# Patient Record
Sex: FEMALE | Race: WHITE | NOT HISPANIC OR LATINO | Employment: OTHER | URBAN - METROPOLITAN AREA
[De-identification: names, ages, dates, MRNs, and addresses within clinical notes are randomized per-mention and may not be internally consistent; named-entity substitution may affect disease eponyms.]

---

## 2021-04-14 ENCOUNTER — TRANSFERRED RECORDS (OUTPATIENT)
Dept: HEALTH INFORMATION MANAGEMENT | Facility: CLINIC | Age: 36
End: 2021-04-14

## 2021-05-15 ENCOUNTER — TRANSFERRED RECORDS (OUTPATIENT)
Dept: HEALTH INFORMATION MANAGEMENT | Facility: CLINIC | Age: 36
End: 2021-05-15

## 2021-05-18 ENCOUNTER — TRANSFERRED RECORDS (OUTPATIENT)
Dept: HEALTH INFORMATION MANAGEMENT | Facility: CLINIC | Age: 36
End: 2021-05-18

## 2021-06-02 ENCOUNTER — OFFICE VISIT (OUTPATIENT)
Dept: FAMILY MEDICINE | Facility: CLINIC | Age: 36
End: 2021-06-02
Payer: COMMERCIAL

## 2021-06-02 VITALS
DIASTOLIC BLOOD PRESSURE: 80 MMHG | HEIGHT: 65 IN | OXYGEN SATURATION: 98 % | SYSTOLIC BLOOD PRESSURE: 117 MMHG | TEMPERATURE: 98.8 F | HEART RATE: 85 BPM | WEIGHT: 200.8 LBS | BODY MASS INDEX: 33.45 KG/M2

## 2021-06-02 DIAGNOSIS — D23.9 DERMATOFIBROMA: ICD-10-CM

## 2021-06-02 DIAGNOSIS — Z76.89 ENCOUNTER TO ESTABLISH CARE WITH NEW DOCTOR: ICD-10-CM

## 2021-06-02 DIAGNOSIS — Z82.0 FAMILY HISTORY OF MS (MULTIPLE SCLEROSIS): Primary | ICD-10-CM

## 2021-06-02 PROBLEM — Z86.59 HISTORY OF DEPRESSION: Status: ACTIVE | Noted: 2021-06-02

## 2021-06-02 PROBLEM — J45.909 ASTHMA: Status: ACTIVE | Noted: 2021-06-02

## 2021-06-02 PROBLEM — F41.9 ANXIETY: Status: ACTIVE | Noted: 2021-06-02

## 2021-06-02 PROCEDURE — 86255 FLUORESCENT ANTIBODY SCREEN: CPT | Mod: 90 | Performed by: STUDENT IN AN ORGANIZED HEALTH CARE EDUCATION/TRAINING PROGRAM

## 2021-06-02 PROCEDURE — 36415 COLL VENOUS BLD VENIPUNCTURE: CPT | Performed by: STUDENT IN AN ORGANIZED HEALTH CARE EDUCATION/TRAINING PROGRAM

## 2021-06-02 PROCEDURE — 99203 OFFICE O/P NEW LOW 30 MIN: CPT | Mod: GC | Performed by: STUDENT IN AN ORGANIZED HEALTH CARE EDUCATION/TRAINING PROGRAM

## 2021-06-02 PROCEDURE — 99000 SPECIMEN HANDLING OFFICE-LAB: CPT | Performed by: STUDENT IN AN ORGANIZED HEALTH CARE EDUCATION/TRAINING PROGRAM

## 2021-06-02 RX ORDER — PNV NO.95/FERROUS FUM/FOLIC AC 28MG-0.8MG
TABLET ORAL
COMMUNITY
End: 2022-03-28

## 2021-06-02 RX ORDER — LORATADINE 10 MG/1
10 TABLET ORAL DAILY PRN
COMMUNITY

## 2021-06-02 ASSESSMENT — MIFFLIN-ST. JEOR: SCORE: 1616.7

## 2021-06-02 NOTE — PROGRESS NOTES
"    Assessment & Plan     Family history of MS (multiple sclerosis)  Cursory neuro exam within normal limits today. Following with Neurology who has recommended full workup that so far has been normal. Needs one last test, NMO antibodies, which we will draw today. Send out lab to [place]. Will fax results to Neuro clinic at [phone].   - Neuromyelitis Optica AQP4 IgG w Reflex Blood    Dermatofibroma  Skin lesion consistent with dermatofibroma. Reassurance provided, especially as it is not changing or growing. Advised her to follow-up if changing or bothersome.    Encounter to establish care with new doctor  Up to date on her HM. No other acute issues.       FUTURE APPOINTMENTS:       - Follow-up visit in 1 year for CPE, sooner prn    No follow-ups on file.    John Finnegan MD  [facility] RENEE Valdes is a 35 year old who presents for the following health issues     HPI     New patient to me. Has been working up neuro symptoms of numbness and tingling with Neurology who has done an MRI which was normal, and have been doing labwork, but weren't able to get one of her labs done, the NMO antibodies. She needs this lab drawn today. No other concerns. Last pap not in our system, but reportedly was done with midwives during last pregnancy a year and a half ago. Works at a [employer]. Also has a lesion she wants looked at on her posterior calf. Has had it for at least a couple years and it is not changing. No spontaneous bleeding.        Objective    /80   Pulse 85   Temp 98.8  F (37.1  C) (Oral)   Ht 1.651 m (5' 5\")   Wt 91.1 kg (200 lb 12.8 oz)   SpO2 98%   BMI 33.41 kg/m    Body mass index is 33.41 kg/m .  Physical Exam  Constitutional:       General: She is not in acute distress.     Appearance: She is well-developed.   HENT:      Head: Normocephalic and atraumatic.      Mouth/Throat:      Mouth: Mucous membranes are moist.   Eyes:      General: No scleral icterus.   "   Conjunctiva/sclera: Conjunctivae normal.   Cardiovascular:      Rate and Rhythm: Normal rate and regular rhythm.      Heart sounds: Normal heart sounds. No murmur. No friction rub.   Pulmonary:      Effort: Pulmonary effort is normal. No respiratory distress.      Breath sounds: Normal breath sounds.   Skin:     General: Skin is warm and dry.      Findings: Lesion (3 mm pink, pearly papule noted on posterior left calf, firm to palpation) present. No erythema.   Neurological:      General: No focal deficit present.      Mental Status: She is alert and oriented to person, place, and time.      Cranial Nerves: No cranial nerve deficit.      Sensory: No sensory deficit.      Motor: No weakness.   Psychiatric:         Mood and Affect: Mood normal.         Thought Content: Thought content normal.

## 2021-06-02 NOTE — PATIENT INSTRUCTIONS
Patient Education   Here is the plan from today's visit    1. Family history of MS (multiple sclerosis)  - Neuromyelitis Optica AQP4 IgG w Reflex Blood    2. Dermatofibroma  Keep an eye on this, and let us know if anything changes with it.      Please call or return to clinic if your symptoms don't go away.    Follow up plan  No follow-ups on file.    Thank you for coming to \[facility]\"" Clinic today.  COVID-19 Vaccine:  If you are eligible for the COVID-19 vaccine, you can schedule via [facility] or call [facility] at [phone]. If you need assistance with scheduling, please speak to a Care Coordinator or your provider.   Lab Testing:  **If you had lab testing today and your results are reassuring or normal they will be mailed to you or sent through [facility] within 7 days.   **If the lab tests need quick action we will call you with the results.  **If you are having labs done on a different day, please call [phone] to schedule at [facility] or [phone] for other [facility] locations.   The phone number we will call with results is # [phone] (home) . If this is not the best number please call our clinic and change the number.  Medication Refills:  If you need any refills please call your pharmacy and they will contact us.   If you need to  your refill at a new pharmacy, please contact the new pharmacy directly. The new pharmacy will help you get your medications transferred faster.   Scheduling:  If you have any concerns about today's visit or wish to schedule another appointment please call our office during normal business hours [phone] (8-5:00 M-F)  If a referral was made to a [facility] and you don't get a call from central scheduling please call [phone].  If a Mammogram was ordered for you at [facility] call [phone] to schedule or change your appointment.  If you had an EKG/XRay/CT/Ultrasound/MRI ordered the number is  [phone] to schedule or change your radiology appointment.   Medical Concerns:  If you have urgent medical concerns please call [phone] at any time of the day.    John Finnegan MD

## 2021-06-02 NOTE — PROGRESS NOTES
Preceptor Attestation:  Patient seen and evaluated in person. I discussed the patient with the resident. I have verified the content of the note, which accurately reflects my assessment of the patient and the plan of care.   Supervising Physician:  Yoel Chen DO.

## 2021-06-04 LAB — AQP4 H2O CHANNEL IGG TITR SERPL IF: NORMAL {TITER}

## 2021-06-08 ENCOUNTER — TRANSFERRED RECORDS (OUTPATIENT)
Dept: HEALTH INFORMATION MANAGEMENT | Facility: CLINIC | Age: 36
End: 2021-06-08

## 2021-11-11 ENCOUNTER — TRANSFERRED RECORDS (OUTPATIENT)
Dept: HEALTH INFORMATION MANAGEMENT | Facility: CLINIC | Age: 36
End: 2021-11-11
Payer: COMMERCIAL

## 2021-11-23 ENCOUNTER — TRANSFERRED RECORDS (OUTPATIENT)
Dept: HEALTH INFORMATION MANAGEMENT | Facility: CLINIC | Age: 36
End: 2021-11-23
Payer: COMMERCIAL

## 2022-01-25 ENCOUNTER — TRANSFERRED RECORDS (OUTPATIENT)
Dept: HEALTH INFORMATION MANAGEMENT | Facility: CLINIC | Age: 37
End: 2022-01-25
Payer: COMMERCIAL

## 2022-03-22 ENCOUNTER — OFFICE VISIT (OUTPATIENT)
Dept: URGENT CARE | Facility: URGENT CARE | Age: 37
End: 2022-03-22
Payer: COMMERCIAL

## 2022-03-22 ENCOUNTER — ANCILLARY PROCEDURE (OUTPATIENT)
Dept: GENERAL RADIOLOGY | Facility: CLINIC | Age: 37
End: 2022-03-22
Attending: NURSE PRACTITIONER
Payer: COMMERCIAL

## 2022-03-22 VITALS
TEMPERATURE: 97.2 F | RESPIRATION RATE: 14 BRPM | OXYGEN SATURATION: 97 % | SYSTOLIC BLOOD PRESSURE: 114 MMHG | DIASTOLIC BLOOD PRESSURE: 66 MMHG | HEART RATE: 82 BPM

## 2022-03-22 DIAGNOSIS — S92.352A CLOSED DISPLACED FRACTURE OF FIFTH METATARSAL BONE OF LEFT FOOT, INITIAL ENCOUNTER: Primary | ICD-10-CM

## 2022-03-22 DIAGNOSIS — S99.921A FOOT INJURY, RIGHT, INITIAL ENCOUNTER: ICD-10-CM

## 2022-03-22 PROCEDURE — 73630 X-RAY EXAM OF FOOT: CPT | Mod: RT | Performed by: RADIOLOGY

## 2022-03-22 PROCEDURE — 99214 OFFICE O/P EST MOD 30 MIN: CPT | Performed by: NURSE PRACTITIONER

## 2022-03-22 RX ORDER — HYDROCODONE BITARTRATE AND ACETAMINOPHEN 5; 325 MG/1; MG/1
1 TABLET ORAL EVERY 6 HOURS PRN
Qty: 10 TABLET | Refills: 0 | Status: SHIPPED | OUTPATIENT
Start: 2022-03-22 | End: 2022-04-01

## 2022-03-22 NOTE — PROGRESS NOTES
Chief Complaint   Patient presents with     Foot Injury     right foot     SUBJECTIVE:  Keisha Carroll is a 36 year old female who presents to the clinic today with a right foot injury. She tripped down concrete step, ankle gave out, severe 5th metatarsal pain, swelling, lost ROM. She has had stress fractures in the past. Wheelchair bound due to inability to bear weight. [place] PDMP checked and no controlled substance within the year.    No past medical history on file.  cholecalciferol 25 MCG (1000 UT) TABS, Take 1,000 Units by mouth  loratadine (CLARITIN) 10 MG tablet, Take 10 mg by mouth  Prenatal Vit-Fe Fumarate-FA (PRENATAL VITAMIN) 27-0.8 MG TABS, Prenatal Vitamin  sertraline (ZOLOFT) 50 MG tablet, Take 1 tablet (50 mg) by mouth daily    No current facility-administered medications on file prior to visit.    Social History     Tobacco Use     Smoking status: Never Smoker     Smokeless tobacco: Never Used   Substance Use Topics     Alcohol use: Yes     Comment: once or twice a week      Allergies   Allergen Reactions     Amoxicillin Hives     Review of Systems   All systems negative except for those listed above in HPI.    EXAM:   /66   Pulse 82   Temp 97.2  F (36.2  C)   Resp 14   SpO2 97%     Physical Exam  Vitals reviewed.   Constitutional:       Appearance: Normal appearance.   HENT:      Head: Normocephalic and atraumatic.   Cardiovascular:      Rate and Rhythm: Normal rate.   Pulmonary:      Effort: Pulmonary effort is normal.   Musculoskeletal:         General: Swelling, tenderness, deformity and signs of injury present.      Comments: Right 5th metatarsal edema, bump, tenderness, limited ROM. Good color, warmth, pulses.   Skin:     General: Skin is warm and dry.      Findings: No bruising, erythema or rash.   Neurological:      General: No focal deficit present.      Mental Status: She is alert and oriented to person, place, and time.   Psychiatric:         Mood and Affect: Mood normal.          Behavior: Behavior normal.       Xray done in clinic read by me as positive for 5th metatarsal fracture.  Results for orders placed or performed in visit on 03/22/22   XR Foot Right G/E 3 Views     Status: None    Narrative    EXAM: XR FOOT RIGHT G/E 3 VIEWS  LOCATION: [facility]  DATE/TIME: 3/22/2022 5:06 PM    INDICATION: Foot pain after a fall on stairs.  COMPARISON: None.      Impression    IMPRESSION: Acute obliquely oriented midshaft fracture of the right fifth metatarsal with 2 mm displacement and mild angulation (apex dorsolateral). No other fracture. Normal joint alignment and spacing. Mild soft tissue swelling in the lateral forefoot.     ASSESSMENT:    ICD-10-CM    1. Closed displaced fracture of fifth metatarsal bone of left foot, initial encounter  S92.352A HYDROcodone-acetaminophen (NORCO) 5-325 MG tablet     Orthopedic  Referral     Ankle/Foot Bracing Supplies Order for DME - ONLY FOR DME   2. Foot injury, right, initial encounter  S99.921A XR Foot Right G/E 3 Views     PLAN:    Foot fracture  Short boot in clinic  Rest, ice, comrpession, elevate  Rotate tylenol and ibuprofen, Norco as needed for severe pain  Ortho tomorrow for follow-up, can call insurance to discuss in network ortho options  ER if numb, pale, cold, pulseless, cannot wiggle toes, severe pain    Patient Instructions     Patient Education     Broken Foot    You have a broken bone (fracture) in your foot. This will cause pain, swelling, and often bruising. It will usually take about 4 to 8 weeks to heal. A foot fracture may be treated with a special shoe, splint, cast, or boot.   Home care  Follow these guidelines when caring for yourself at home:    You may be given a splint, cast, shoe, or boot to keep the injured area from moving. Unless you were told otherwise, use crutches or a walker. Don t put weight on the injured foot until your healthcare provider says you can do so. (You can rent crutches  and a walker at many drugstores and surgical or orthopedic supply stores.) Don t put weight on a splint, or it may break.    Keep your leg elevated to reduce pain and swelling. When sleeping, put a pillow under the injured leg. When sitting, support the injured leg so it's above your heart. This is very important during the first 2 days (48 hours).    Put an ice pack on the injured area. Do this for 20 minutes every 1 to 2 hours the first day for pain relief. You can make an ice pack by wrapping a plastic bag of ice cubes in a thin towel. As the ice melts, be careful that the splint, cast, boot, or shoe doesn t get wet. You can place the ice pack directly over the splint or cast. Unless told otherwise, you can open the boot or shoe to apply the ice pack. Continue using the ice pack 3 to 4 times a day for the next 2 days. Then use the ice pack as needed to ease pain and swelling.    Keep the splint, cast, boot, or shoe dry. When bathing, protect it with a large plastic bag, rubber-banded at the top end. If a fiberglass splint or cast or boot gets wet, you can dry it with a hair dryer. Unless told otherwise, you can take off the boot or shoe to bathe.    You may use acetaminophen or ibuprofen to control pain, unless another pain medicine was prescribed. If you have chronic liver or kidney disease, talk with your healthcare provider before using these medicines. Also talk with your provider if you ve had a stomach ulcer or gastrointestinal bleeding.    Don t put creams or objects under the cast if you have itching.  Follow-up care  Follow up with your healthcare provider, or as advised. This is to make sure the bone is healing the way it should. If you were given a splint, it may be changed to a cast or boot at your follow-up visit.   X-rays may be taken. You will be told of any new findings that may affect your care.  When to seek medical advice  Call your healthcare provider right away if any of these occur:    The  cast or splint cracks    The plaster cast or splint becomes wet or soft    The fiberglass cast or splint stays wet for more than 24 hours    Bad odor from the cast or wound fluid stains the cast    Tightness or pain under the cast or splint gets worse    Toes become swollen, cold, blue, numb, or tingly    You can t move your toes    Skin around cast or splint becomes red or swollen    Fever of 100.4 F (38 C) or higher, or as directed by your healthcare provider    Tanika Robison last reviewed this educational content on 9/1/2019 2000-2021 [employer]. All rights reserved. This information is not intended as a substitute for professional medical care. Always follow your healthcare professional's instructions.             Follow up with primary care provider with any problems, questions or concerns or if symptoms worsen or fail to improve. Patient agreed to plan and verbalized understanding.    Tanja Charles, JIN-BC  [facility]

## 2022-03-22 NOTE — PATIENT INSTRUCTIONS
Patient Education     Broken Foot    You have a broken bone (fracture) in your foot. This will cause pain, swelling, and often bruising. It will usually take about 4 to 8 weeks to heal. A foot fracture may be treated with a special shoe, splint, cast, or boot.   Home care  Follow these guidelines when caring for yourself at home:    You may be given a splint, cast, shoe, or boot to keep the injured area from moving. Unless you were told otherwise, use crutches or a walker. Don t put weight on the injured foot until your healthcare provider says you can do so. (You can rent crutches and a walker at many drugstores and surgical or orthopedic supply stores.) Don t put weight on a splint, or it may break.    Keep your leg elevated to reduce pain and swelling. When sleeping, put a pillow under the injured leg. When sitting, support the injured leg so it's above your heart. This is very important during the first 2 days (48 hours).    Put an ice pack on the injured area. Do this for 20 minutes every 1 to 2 hours the first day for pain relief. You can make an ice pack by wrapping a plastic bag of ice cubes in a thin towel. As the ice melts, be careful that the splint, cast, boot, or shoe doesn t get wet. You can place the ice pack directly over the splint or cast. Unless told otherwise, you can open the boot or shoe to apply the ice pack. Continue using the ice pack 3 to 4 times a day for the next 2 days. Then use the ice pack as needed to ease pain and swelling.    Keep the splint, cast, boot, or shoe dry. When bathing, protect it with a large plastic bag, rubber-banded at the top end. If a fiberglass splint or cast or boot gets wet, you can dry it with a hair dryer. Unless told otherwise, you can take off the boot or shoe to bathe.    You may use acetaminophen or ibuprofen to control pain, unless another pain medicine was prescribed. If you have chronic liver or kidney disease, talk with your healthcare provider before  using these medicines. Also talk with your provider if you ve had a stomach ulcer or gastrointestinal bleeding.    Don t put creams or objects under the cast if you have itching.  Follow-up care  Follow up with your healthcare provider, or as advised. This is to make sure the bone is healing the way it should. If you were given a splint, it may be changed to a cast or boot at your follow-up visit.   X-rays may be taken. You will be told of any new findings that may affect your care.  When to seek medical advice  Call your healthcare provider right away if any of these occur:    The cast or splint cracks    The plaster cast or splint becomes wet or soft    The fiberglass cast or splint stays wet for more than 24 hours    Bad odor from the cast or wound fluid stains the cast    Tightness or pain under the cast or splint gets worse    Toes become swollen, cold, blue, numb, or tingly    You can t move your toes    Skin around cast or splint becomes red or swollen    Fever of 100.4 F (38 C) or higher, or as directed by your healthcare provider    Tanika Robison last reviewed this educational content on 9/1/2019 2000-2021 [employer]. All rights reserved. This information is not intended as a substitute for professional medical care. Always follow your healthcare professional's instructions.

## 2022-03-23 ENCOUNTER — TELEPHONE (OUTPATIENT)
Dept: ORTHOPEDICS | Facility: CLINIC | Age: 37
End: 2022-03-23
Payer: COMMERCIAL

## 2022-03-23 ENCOUNTER — PRE VISIT (OUTPATIENT)
Dept: ORTHOPEDICS | Facility: CLINIC | Age: 37
End: 2022-03-23
Payer: COMMERCIAL

## 2022-03-23 ENCOUNTER — DOCUMENTATION ONLY (OUTPATIENT)
Dept: ORTHOPEDICS | Facility: CLINIC | Age: 37
End: 2022-03-23
Payer: COMMERCIAL

## 2022-03-23 ENCOUNTER — OFFICE VISIT (OUTPATIENT)
Dept: PODIATRY | Facility: CLINIC | Age: 37
End: 2022-03-23
Payer: COMMERCIAL

## 2022-03-23 VITALS
SYSTOLIC BLOOD PRESSURE: 114 MMHG | HEIGHT: 65 IN | BODY MASS INDEX: 31.65 KG/M2 | DIASTOLIC BLOOD PRESSURE: 66 MMHG | WEIGHT: 190 LBS

## 2022-03-23 DIAGNOSIS — S92.351A DISPLACED FRACTURE OF FIFTH METATARSAL BONE, RIGHT FOOT, INITIAL ENCOUNTER FOR CLOSED FRACTURE: ICD-10-CM

## 2022-03-23 DIAGNOSIS — M79.671 RIGHT FOOT PAIN: Primary | ICD-10-CM

## 2022-03-23 DIAGNOSIS — S92.352A CLOSED DISPLACED FRACTURE OF FIFTH METATARSAL BONE OF LEFT FOOT, INITIAL ENCOUNTER: ICD-10-CM

## 2022-03-23 PROCEDURE — 99204 OFFICE O/P NEW MOD 45 MIN: CPT | Performed by: PODIATRIST

## 2022-03-23 NOTE — TELEPHONE ENCOUNTER
Patient has a referral regarding Closed displaced fracture of fifth metatarsal bone of left foot, to be seen in 1-2 days.  Please assist patient with scheduling appt with appropriate provider within appropriate time frame at [phone].  Thank you for you help!

## 2022-03-23 NOTE — PATIENT INSTRUCTIONS
Thank you for choosing [facility] Podiatry / Foot & Ankle Surgery!    DR MANCILLA'S CLINIC:  [facility]   [address]   [place] [address]      TRIAGE LINE: [phone] - [address]  APPOINTMENTS: [phone]  RADIOLOGY: [phone]  SET UP SURGERY: [phone]  FAX NUMBER: [phone]  BILLING QUESTIONS: [phone]       Follow up: We will call you to schedule surgery.  If you do not hear from us in 2 to 3 days please call the number above    TOE & METATARSAL FRACTURES  The structure of the foot is complex, consisting of bones, muscles, tendons, and other soft tissues. Of the 26 bones in the foot, 19 are toe bones (phalanges) and metatarsal bones (the long bones in the midfoot). Fractures of the toe and metatarsal bones are common and require evaluation by a specialist. A foot and ankle surgeon should be seen for proper diagnosis and treatment, even if initial treatment has been received in an emergency room.  A fracture is a break in the bone. Fractures can be divided into two categories: traumatic fractures and stress fractures.  TRAUMATIC FRACTURES (also called acute fractures) are caused by a direct blow or impact, such as seriously stubbing your toe. Traumatic fractures can be displaced or non-displaced. If the fracture is displaced, the bone is broken in such a way that it has changed in position (dislocated).  Signs and symptoms of a traumatic fracture include:  You may hear a sound at the time of the break.    Pinpoint pain  (pain at the place of impact) at the time the fracture occurs and perhaps for a few hours later, but often the pain goes away after several hours.   Crooked or abnormal appearance of the toe.   Bruising and swelling the next day.   It is not true that  if you can walk on it, it s not broken.  Evaluation by a foot and ankle surgeon is always recommended.   STRESS FRACTURES are tiny, hairline breaks that are usually caused by repetitive stress. Stress  fractures often afflict athletes who, for example, too rapidly increase their running mileage. They can also be caused by an abnormal foot structure, deformities, or osteoporosis. Improper footwear may also lead to stress fractures. Stress fractures should not be ignored. They require proper medical attention to heal correctly.  Symptoms of stress fractures include:  Pain with or after normal activity   Pain that goes away when resting and then returns when standing or during activity    Pinpoint pain  (pain at the site of the fracture) when touched   Swelling, but no bruising   IMPROPER TREATMENT  Some people say that  the doctor can t do anything for a broken bone in the foot.  This is usually not true. In fact, if a fractured toe or metatarsal bone is not treated correctly, serious complications may develop. For example:  A deformity in the bony architecture which may limit the ability to move the foot or cause difficulty in fitting shoes   Arthritis, which may be caused by a fracture in a joint (the juncture where two bones meet), or may be a result of angular deformities that develop when a displaced fracture is severe or hasn t been properly corrected   Chronic pain and deformity   Non-union, or failure to heal, can lead to subsequent surgery or chronic pain.   PROPER TREATMENT FOR TOES  Fractures of the toe bones are almost always traumatic fractures. Treatment for traumatic fractures depends on the break itself and may include these options:  Rest. Sometimes rest is all that is needed to treat a traumatic fracture of the toe.   Splinting. The toe may be fitted with a splint to keep it in a fixed position.   Rigid or stiff-soled shoe. Wearing a stiff-soled shoe protects the toe and helps keep it properly positioned.    Jp taping  the fractured toe to another toe is sometimes appropriate, but in other cases it may be harmful.   Surgery. If the break is badly displaced or if the joint is affected, surgery  may be necessary. Surgery often involves the use of fixation devices, such as pins.   PROPER TREATMENT OF METATARSALS  Breaks in the metatarsal bones may be either stress or traumatic fractures. Certain kinds of fractures of the metatarsal bones present unique challenges.  For example, sometimes a fracture of the first metatarsal bone (behind the big toe) can lead to arthritis. Since the big toe is used so frequently and bears more weight than other toes, arthritis in that area can make it painful to walk, bend, or even stand.  Another type of break, called a Tesfaye fracture, occurs at the base of the fifth metatarsal bone (behind the little toe). It is often misdiagnosed as an ankle sprain, and misdiagnosis can have serious consequences since sprains and fractures require different treatments. Your foot and ankle surgeon is an expert in correctly identifying these conditions as well as other problems of the foot.  Treatment of metatarsal fractures depends on the type and extent of the fracture, and may include:  Rest. Sometimes rest is the only treatment needed to promote healing of a stress or traumatic fracture of a metatarsal bone.   Avoid the offending activity. Because stress fractures result from repetitive stress, it is important to avoid the activity that led to the fracture. Crutches or a wheelchair are sometimes required to offload weight from the foot to give it time to heal.   Immobilization, casting, or rigid shoe. A stiff-soled shoe or other form of immobilization may be used to protect the fractured bone while it is healing.   Surgery. Some traumatic fractures of the metatarsal bones require surgery, especially if the break is badly displaced.   Follow-up care. Your foot and ankle surgeon will provide instructions for care following surgical or non-surgical treatment. Physical therapy, exercises and rehabilitation may be included in a schedule for return to normal activities.     GETTING READY FOR  YOUR SURGERY  ONE-THREE WEEKS BEFORE  1. See your Family Doctor or Primary Care Doctor for a History and Physical. If you do not, we may need to change the date of your surgery.  2. Please see pre-surgical medications below to which medications need to be stopped before surgery and when.    TEN OR MORE DAYS BEFORE    1. [place] with the hospital. (For [facility])      By Phone: [phone].      By Internet: [email or web address] Choose [facility].      If you do not register by phone or online, we will call to help you register.    SAME DAY SURGERY PATIENTS  1. You will need a family member of friend to drive you home. If you do not have one the surgery will be cancelled or rescheduled.  2. You will need a responsible adult to stay with you that night after the surgery.       We will ask this person to listen to some instructions before you leave the hospital.  * If your child is having surgery, and you would like a tour of the hospital, please call: [phone].      DAY BEFORE SURGERY  1. DO NOT EAT OR DRINK ANYTHING AFTER MIDNIGHT THE NIGHT BEFORE YOUR SURGERY!   2. DO NOT DRINK ALCOHOL.  3. Do not take over the counter drugs.  4. Some people need to have blood tests at the hospital. If you need blood tests, we will tell you in advance.  5. Take medications as directed by your doctor. You may take these with a small amount of water.  6. Do not chew gum, chew tobacco, or suck on hard candy the day of surgery.  7. Bring your insurance cards, a list of your medicines and co-pays you might need. Leave jewelry and other valuables at home.  8. If you received papers at your doctor's office, bring these with you to the surgery.    If you have questions about these instructions, please call: [phone]  Ask to speak with a pre-admitting nurse.    PRESURGICAL MEDICATIONS:  Certain prescription, over-the-counter, and herbal medications interfere with healing after an operation. The main concern  relates to medications that increase bleeding at the surgical site. Excess blood under the incision results in poor wound healing, excess pain, increased scarring, and a higher risk of infection.    Some medications slow the healing process of bone. Medications can also interfere with the anesthesia drugs that keep you asleep during the operation. It is important to ensure that these medications are out of your system prior to the operation. The list below details a number of medications that are of concern. Pay special attention to how long you should avoid these medications before your operation. Please note that this list is not complete. You should ask your surgeon or pharmacist if you are uncertain about other medications. Any herbal supplement not listed should be discontinued at least one week prior to surgery.    Aspirin: Hold for one week prior to surgery and restart the day after surgery. This over the counter medication promotes bleeding.    Motrin / Ibuprofen / Aleve / Advil / NSAIDS:  Stop one week prior to surgery. These medications affect bleeding and may cause delay in bone healing. Avoid taking these medications for six weeks after bone surgeries. Other procedures may allow you to restart sooner than 6 weeks after surgery.    Coumadin / Plavix: Your primary care provider will manage Coumadin in relation to surgery. Coumadin may result in excessive bleeding and may be adjusted before and after surgery.    Enbriel: Stop two weeks prior to surgery and restart two weeks after surgery. This medication can effect soft tissue healing and increases the risk of infection.    Remicade: Stop 8-12 weeks before surgery and restart two weeks after surgery. This medication can affect soft tissue healing and increases the risk of infection.    Humira: Stop 4 weeks before surgery and restart two weeks after surgery. This medication can affect soft tissue healing and increases the risk of infection.    Methotrexate:  Stop one dose prior to surgery. This medication will be restarted when the wound appears to be healing well. Please ask your surgeon about restarting this medication when you are being seen in the office for wound checks.    Kava: Stop at least one day prior to surgery and may restart one day after surgery. Kava may increase the sedative effect of anesthetics that are given during the operation. Kava can also increase bleeding at the surgical site.    Ephedra (ma torres): Stop at least one day prior to surgery and may restart one day after surgery.  Ephedra may increase the risk of heart attack and stroke. This medication can also increase bleeding at the surgical site.    Praful's Wort: Stop at least five days before surgery and may restart one day after surgery. [place]'s wort may diminish the effects of several drugs that are given during surgery.    Ginseng: Stop at least one week prior to surgery and may restart one day after surgery.  Ginseng lowers blood sugar and may increase bleeding at the surgical site.    Ginkgo: Stop 36 hours before surgery and may restart one day after surgery. Ginkgo may increase bleeding at the surgical site.    Garlic: Stop at least one week prior to surgery and may restart one day after. Garlic may increase bleeding at the surgery site.    Valerian: Do a slow steady decrease in your daily dose over a period of 2-3 weeks before surgery to decrease the chance of withdrawal symptoms. Valerian may increase the sedative effect of anesthetics given during the operation.    Echinacea: There is no data on stopping echinacea prior to surgery. This medication though can be associated with allergic reactions and suppression of your immune system.    Vitamin E, Omega-3, Flax, Fish Oil, Glucosamine and Chondroitin: Stop 2 weeks prior to surgery and may restart one day after. These herbal medications can increase risk of bleeding at surgical site.     POTENTIAL COMPLICATIONS OF FOOT & ANKLE  SURGERY  Undergoing a surgical procedure involves a certain amount of risk. Risks of complications vary depending on the complexity of the surgery and how you take care of the surgical area during the healing process. Complications can range from minor infection to death. Some complications are temporary while others will be permanent.  Your surgeon weighs the risk of complications vs the potential benefit of undergoing surgery. You need to consider your tolerance for unexpected problems as you decide whether to undergo surgery.    Foot and Ankle surgery involves cutting skin, bone, ligaments, blood vessels and joints.  These structures heal well but not without consequence. Any break to the skin can lead to infection. A deep infection involves bones or joints which can be devastating. Deep infection can lead to amputation or could spread to other parts of your body. Most infections are minor and easily treated with oral antibiotics. Infections are often times from bacteria already present on your skin. Proper care of the surgical site is an essential component of avoiding infection. Do not get the bandage wet and take proper care of external pins to avoid these problems.     Joint stiffness is inherent to any foot or ankle surgery. Joint surgery is a major component of reconstructive foot and ankle procedures. The ligaments and tissues around the joint are cut, and later repaired. Scare tissue limits joint mobility. This can be permanent but generally improves over the course of one year.    Surgery involves dissection around nerves. Visible nerves are moved out of the way while very small nerves are cut. Scar tissue develops around nerves and can lead to nerve pain, numbness, or neuromas. Nerve symptoms can be permanent. This can lead to numbness or sometimes hypersensitivity to touch and problems wearing shoes.    Bones do not always heal after surgery. Poor healing after a bone cut or joint fusion can lead to an  extended period of casting or repeat surgery. Electronic bone stimulators are sometimes used to stimulate poor healing of bone. Nonunion is when joint fusion does not take.  This can occur as often as 10% of the time. Smoking doubles your risk of poor bone healing to 20%.    Bone grafting is sometimes necessary during the original or subsequent surgery. Bone is sometimes taken from other parts of your body or freeze dried bone from a bone bank from a bone bank or synthetic bone material might be used.    A scar is always present after foot and ankle surgery. The scar will be visible and could be sensitive. Some people develop excessive scarring, which cannot be controlled by the surgeon. Scars can be unsightly and can restrict joint mobility.    Blood clots can develop in the calf after surgery. Foot and ankle surgery is a predisposing factor for blood clots. The blood clot could break and travel to your lung.  This condition can lead to death. Early warning signs could include calf swelling and pain, chest pain or shortness of breath. This is an emergency that requires immediate attention by a medical doctor!    Surgery will not necessarily create a pain-free foot. Even normal feet hurt. Crooked toes, bunions, neuromas, flat feet and arthritis should all be considered permanent conditions.  Ankle pain commonly requires multiple surgeries over a lifetime. Do not assume that having surgery will permanently fix your condition. You may need permanent alteration in shoes and activities to accommodate your foot and ankle problem.    Careful attention to post-operative recommendations will dramatically reduce your risk of complications. Proper dressing, wound care, elevation and rest will be essential to get the wound healed and minimize scarring. Strict attention to activity restrictions, such as non-weight bearing, or partial weight bearing is essential. Internal fixation devices may not resist the stress of walking.  Some select surgeries allow the patient to walk, however this should be very minimal.    Despite these concerns, foot and ankle surgery leads to a high level of patient satisfaction. Your surgeon would not recommend surgery if he/she did not expect your foot to improve. Talk to your surgeon about any of the above issues.    POST OPERATIVE HOME CARE INSTRUCTIONS  Activities: You should rest today. Stay off your feet as much as possible and keep your foot elevated above the level of your heart (about two pillow height). Wear your surgical shoe at all times when up. Limit walking to 5 to 10 minutes per hour over the next few days if your doctor has previously told you that you can put some weight on the foot after surgery, although limit the weight to your heel. If you are supposed to be non-weight bearing, that means NO WEIGHT AT ALL ON THE FOOT. Use an ice pack on the ankle while awake 20 minutes per hour to help decrease pain and swelling.     Discomfort: If a prescription for pain was given, take as directed. If no pain medication was ordered, you may take a non-prescription, non-aspirin pain medication. If the pain is not relieved by pain medication, call the clinic.     Incision and Dressing: Your surgical dressing is a sterile dressing and should be left in place until removed by your physician. Keep the dressing dry by covering it with a plastic bag for showers, taking baths with the surgical foot out of the tub, or sponge bathing. Some bleeding on the dressing should be expected. If however, you notice active or excessive bleeding or a temperature over 100 degrees by mouth, call the clinic. Do not change dressing by yourself.  If the dressing becomes wet or dirty, please call the clinic as it may need a new sterile dressing applied. You may start getting the foot wet after the stitches are removed.     Do not wear regular shoes with a surgical bandage and/or external pins in your foot. Wear loose fitting  clothing that easily will slip over the bandage and/or pins. Do not cover your surgical foot with blankets as they may damage the dressing/pins. Also, remember that dogs are not aware of your surgery. Please keep them away from the bandage/pins.   If your surgeon places external pins in your foot, you must keep the foot dry until the pins are removed at 6-8 weeks after the surgery. Pins should be covered with a dressing for protection. You should examine the pins and your skin often. Check for any spreading redness or yellow drainage from the pin areas. Do not apply ointment around the pins. Do not push a loose pin back into your foot. Please call the clinic if the pin is spinning or moving in and out. If the pins are bumped or loosened they may need to be removed early. This may affect your surgical outcome.   Please call the clinic if you feel there is a problem with your pins and/or surgical bandage.    TIPS FOR SUCCESSFUL HEALING  How you care for the surgical site is critically important to achieve a successful result after surgery. Avoidance of injury, infection, excess swelling, scar tissue and stiffness are highly dependent on the care you provide over the next six weeks. Please do not hesitate to call if you have questions or concerns.   Your foot requires significant rest and elevation. Sitting for long hours with your foot elevated, however, will create its own problems. Expect muscle aches, back pain, cramps, etc. Optimal posture, lumbar support, back exercises, ice and heat may all help with your new aches and pains. Do not apply a heating pad to your foot or leg as this can cause increase swelling and pain. Rather use ice in those areas.   Pain medications cause drowsiness. You may frequently sleep during the day and then have trouble sleeping at night. Over the counter sleep aids might be more effective than narcotic pain medication to achieve a reasonable night's sleep.    Narcotic pain medications  and inactivity lead to constipation. Limiting use of narcotics will help minimize this problem. The pain medications will not completely alleviate your pain. The purpose of pain pills is to take the edge off and help you get through the first few days. You can substitute Extra Strength Tylenol if pain is mild. Please note that narcotic pain pills usually contain acetaminophen (the active ingredient in Tylenol) so be careful to avoid the maximum dose of acetaminophen. You should take measures to avoid constipation by drinking plenty of water, eating lots of fruit and vegetables and taking the recommended dose of Metamucil or a similar fiber supplement. These measures should be continued for as long as you require narcotic pain medications and are inactive.     Showering is a major challenge. Your incision requires about three days to become sealed from water. Your bandage should not get wet and should not be removed. Do not attempt showering for the first three days. A sponge bath is preferred. You may attempt to shower on the fourth day after the operation. Your foot should be covered with a bag, tape and rubber bands. Double bagging is preferred. Standing in the shower with a bag on your foot is quite hazardous. A portable shower stool would be ideal. The bandage will need to be changed in the office if it becomes moistened. A moist bandage will not dry on its own. A moist dressing may lead to infection.   Stiffness will develop after any operation due to scarring. The scar tissue begins to form immediately after the surgery. Inactivity can cause excess stiffness and may lead to blood clots in your legs. Frequent range of motion exercises will help decrease stiffness, blood clots, scar tissue and adhesions. Please call if you are unsure about these recommendations.   Good luck and best wishes on a prompt recovery. Healing is slow but an important step in your recovery. You are in control of the final result. Please  use this time wisely. Please do not hesitate to call if you have questions, concerns or comments.    * If you have any post-operative questions or concerns regarding your procedure, call our triage team at the [facility] at [phone] (option 3).

## 2022-03-23 NOTE — LETTER
3/23/2022         RE: Keisha Carroll  [address]  [facility] [address]        Dear Colleague,    Thank you for referring your patient, Keisha Carroll, to the [facility]. Please see a copy of my visit note below.    PATIENT HISTORY:  Tanja Charles NP requested I see this patient for their foot issue.  Keisha Carroll is a 36 year old female who presents to clinic for right foot fracture.  Patient notes that she fractured it yesterday.  She stepped off the side of a curb and heard the crack.  Had instant pain swelling and bruising and was not able to put weight on the right foot.  She states that she thinks she had a stress fracture for a few weeks before this as it was sore.  Pain at the time was 9 out of 10.  Currently is about 2 out of 10.  Here with her significant other.  They would like to discuss x-rays and the next steps.  Patient denies fever, nausea, vomiting.    Review of Systems:  Patient denies fever, chills, rash, wound, stiffness,  numbness, weakness, heart burn, blood in stool, chest pain with activity, calf pain when walking, shortness of breath with activity, chronic cough, easy bleeding/bruising, swelling of ankles, excessive thirst, fatigue, depression, anxiety.  Patient admits to limping.     PAST MEDICAL HISTORY: No past medical history on file.     PAST SURGICAL HISTORY: No past surgical history on file.     MEDICATIONS:   Current Outpatient Medications:      HYDROcodone-acetaminophen (NORCO) 5-325 MG tablet, Take 1 tablet by mouth every 6 hours as needed for severe pain, Disp: 10 tablet, Rfl: 0     loratadine (CLARITIN) 10 MG tablet, Take 10 mg by mouth, Disp: , Rfl:      cholecalciferol 25 MCG (1000 UT) TABS, Take 1,000 Units by mouth, Disp: , Rfl:      Prenatal Vit-Fe Fumarate-FA (PRENATAL VITAMIN) 27-0.8 MG TABS, Prenatal Vitamin (Patient not taking: Reported on 3/23/2022), Disp: , Rfl:      sertraline (ZOLOFT) 50 MG tablet, Take 1 tablet  "(50 mg) by mouth daily, Disp: 30 tablet, Rfl: 3     ALLERGIES:    Allergies   Allergen Reactions     Amoxicillin Hives        SOCIAL HISTORY:   Social History     Socioeconomic History     Marital status:      Spouse name: Not on file     Number of children: Not on file     Years of education: Not on file     Highest education level: Not on file   Occupational History     Not on file   Tobacco Use     Smoking status: Never Smoker     Smokeless tobacco: Never Used   Substance and Sexual Activity     Alcohol use: Yes     Comment: once or twice a week      Drug use: Yes     Types: Marijuana     Sexual activity: Yes     Partners: Male     Birth control/protection: None   Other Topics Concern     Not on file   Social History Narrative     Not on file     Social Determinants of Health     Financial Resource Strain: Not on file   Food Insecurity: Not on file   Transportation Needs: Not on file   Physical Activity: Not on file   Stress: Not on file   Social Connections: Not on file   Intimate Partner Violence: Not on file   Housing Stability: Not on file        FAMILY HISTORY:   Family History   Problem Relation Age of Onset     Tumor Mother      Multiple Sclerosis Maternal Grandfather         EXAM:Vitals: /66   Ht 1.651 m (5' 5\")   Wt 86.2 kg (190 lb)   BMI 31.62 kg/m    BMI= Body mass index is 31.62 kg/m .    General appearance: Patient is alert and fully cooperative with history & exam.  No sign of distress is noted during the visit.     Psychiatric: Affect is pleasant & appropriate.  Patient appears motivated to improve health.     Respiratory: Breathing is regular & unlabored while sitting.     HEENT: Hearing is intact to spoken word.  Speech is clear.  No gross evidence of visual impairment that would impact ambulation.     Dermatologic: Skin is intact to both lower extremities without significant lesions, rash or abrasion.  No paronychia or evidence of soft tissue infection is noted.     Vascular: DP " & PT pulses are intact & regular bilaterally.  Moderate edema to the right foot.  No varicosities are noted..  CFT and skin temperature is normal to both lower extremities.     Neurologic: Lower extremity sensation is intact to light touch.  No evidence of weakness or contracture in the lower extremities.  No evidence of neuropathy.     Musculoskeletal: Patient is ambulatory without assistive device or brace.  Pain on palpation of the right fifth metatarsal.    Radiographs: Right foot x-ray - I personally reviewed the xrays - Acute obliquely oriented midshaft fracture of the right fifth metatarsal with 2 mm displacement and mild angulation (apex dorsolateral). No other fracture. Normal joint alignment and spacing. Mild soft tissue swelling in the lateral forefoot.     ASSESSMENT:    Right foot pain  Displaced fracture of fifth metatarsal bone, right foot, initial encounter for closed fracture       Medical Decision Making/Plan:  Reviewed patient's chart in [facility].  Reviewed and discussed x-rays with patient. Talked about fractures. Discussed that healing can take 6-10 weeks. Risk that the fracture will not heal and we may need to do surgery. Risk is increased 10-15% if you smoke.     Discussed that based on x-ray and the position of the fracture pieces being significantly farther apart I would recommend surgery to correct this as this will likely not heal or take significant amount of time to heal without surgical intervention and reduction. Talked about risks including infection, numbness, continued pain, non union, recurrence, need for further surgery, blood loss, blood clotting. You will scar.    Discussed she would be in a boot for 6 to 8 weeks minimal heel weightbearing.  It is her right foot so she cannot drive during that time.  She did note that she was breast-feeding but is planning on weaning off.  Discussed that this would be good as far as for pain medication after surgery.    Patient wishes to proceed  with surgery.  We will put in a surgery request and have my  contact them.  All questions were answered to patient satisfaction and they will call for the questions or concerns.    Patient risk factor: Patient is at low risk for infection.        Rut Tapia DPM, Podiatry/Foot and Ankle Surgery        Again, thank you for allowing me to participate in the care of your patient.        Sincerely,        Rut Tapia DPM, Podiatry/Foot and Ankle Surgery

## 2022-03-23 NOTE — TELEPHONE ENCOUNTER
DIAGNOSIS: 5th Metatarsal fracture, DOI 3/22/22 Triaged by Dr. Chu // Message left for patient, confirm patient aware of appointment, KIMMIE   APPOINTMENT DATE: 03/29/22   NOTES STATUS DETAILS   OFFICE NOTE from referring provider Internal 03/22/22 [facility] urgent care   MEDICATION LIST Internal    LABS     XRAYS (IMAGES & REPORTS) Internal 03/22/22, [facility]      Statement Selected

## 2022-03-23 NOTE — PROGRESS NOTES
PATIENT HISTORY:  Tanja Charles NP requested I see this patient for their foot issue.  Keisha Carroll is a 36 year old female who presents to clinic for right foot fracture.  Patient notes that she fractured it yesterday.  She stepped off the side of a curb and heard the crack.  Had instant pain swelling and bruising and was not able to put weight on the right foot.  She states that she thinks she had a stress fracture for a few weeks before this as it was sore.  Pain at the time was 9 out of 10.  Currently is about 2 out of 10.  Here with her significant other.  They would like to discuss x-rays and the next steps.  Patient denies fever, nausea, vomiting.    Review of Systems:  Patient denies fever, chills, rash, wound, stiffness,  numbness, weakness, heart burn, blood in stool, chest pain with activity, calf pain when walking, shortness of breath with activity, chronic cough, easy bleeding/bruising, swelling of ankles, excessive thirst, fatigue, depression, anxiety.  Patient admits to limping.     PAST MEDICAL HISTORY: No past medical history on file.     PAST SURGICAL HISTORY: No past surgical history on file.     MEDICATIONS:   Current Outpatient Medications:      HYDROcodone-acetaminophen (NORCO) 5-325 MG tablet, Take 1 tablet by mouth every 6 hours as needed for severe pain, Disp: 10 tablet, Rfl: 0     loratadine (CLARITIN) 10 MG tablet, Take 10 mg by mouth, Disp: , Rfl:      cholecalciferol 25 MCG (1000 UT) TABS, Take 1,000 Units by mouth, Disp: , Rfl:      Prenatal Vit-Fe Fumarate-FA (PRENATAL VITAMIN) 27-0.8 MG TABS, Prenatal Vitamin (Patient not taking: Reported on 3/23/2022), Disp: , Rfl:      sertraline (ZOLOFT) 50 MG tablet, Take 1 tablet (50 mg) by mouth daily, Disp: 30 tablet, Rfl: 3     ALLERGIES:    Allergies   Allergen Reactions     Amoxicillin Hives        SOCIAL HISTORY:   Social History     Socioeconomic History     Marital status:      Spouse name: Not on file     Number of  "children: Not on file     Years of education: Not on file     Highest education level: Not on file   Occupational History     Not on file   Tobacco Use     Smoking status: Never Smoker     Smokeless tobacco: Never Used   Substance and Sexual Activity     Alcohol use: Yes     Comment: once or twice a week      Drug use: Yes     Types: Marijuana     Sexual activity: Yes     Partners: Male     Birth control/protection: None   Other Topics Concern     Not on file   Social History Narrative     Not on file     Social Determinants of Health     Financial Resource Strain: Not on file   Food Insecurity: Not on file   Transportation Needs: Not on file   Physical Activity: Not on file   Stress: Not on file   Social Connections: Not on file   Intimate Partner Violence: Not on file   Housing Stability: Not on file        FAMILY HISTORY:   Family History   Problem Relation Age of Onset     Tumor Mother      Multiple Sclerosis Maternal Grandfather         EXAM:Vitals: /66   Ht 1.651 m (5' 5\")   Wt 86.2 kg (190 lb)   BMI 31.62 kg/m    BMI= Body mass index is 31.62 kg/m .    General appearance: Patient is alert and fully cooperative with history & exam.  No sign of distress is noted during the visit.     Psychiatric: Affect is pleasant & appropriate.  Patient appears motivated to improve health.     Respiratory: Breathing is regular & unlabored while sitting.     HEENT: Hearing is intact to spoken word.  Speech is clear.  No gross evidence of visual impairment that would impact ambulation.     Dermatologic: Skin is intact to both lower extremities without significant lesions, rash or abrasion.  No paronychia or evidence of soft tissue infection is noted.     Vascular: DP & PT pulses are intact & regular bilaterally.  Moderate edema to the right foot.  No varicosities are noted..  CFT and skin temperature is normal to both lower extremities.     Neurologic: Lower extremity sensation is intact to light touch.  No evidence of " weakness or contracture in the lower extremities.  No evidence of neuropathy.     Musculoskeletal: Patient is ambulatory without assistive device or brace.  Pain on palpation of the right fifth metatarsal.    Radiographs: Right foot x-ray - I personally reviewed the xrays - Acute obliquely oriented midshaft fracture of the right fifth metatarsal with 2 mm displacement and mild angulation (apex dorsolateral). No other fracture. Normal joint alignment and spacing. Mild soft tissue swelling in the lateral forefoot.     ASSESSMENT:    Right foot pain  Displaced fracture of fifth metatarsal bone, right foot, initial encounter for closed fracture       Medical Decision Making/Plan:  Reviewed patient's chart in [facility].  Reviewed and discussed x-rays with patient. Talked about fractures. Discussed that healing can take 6-10 weeks. Risk that the fracture will not heal and we may need to do surgery. Risk is increased 10-15% if you smoke.     Discussed that based on x-ray and the position of the fracture pieces being significantly farther apart I would recommend surgery to correct this as this will likely not heal or take significant amount of time to heal without surgical intervention and reduction. Talked about risks including infection, numbness, continued pain, non union, recurrence, need for further surgery, blood loss, blood clotting. You will scar.    Discussed she would be in a boot for 6 to 8 weeks minimal heel weightbearing.  It is her right foot so she cannot drive during that time.  She did note that she was breast-feeding but is planning on weaning off.  Discussed that this would be good as far as for pain medication after surgery.    Patient wishes to proceed with surgery.  We will put in a surgery request and have my  contact them.  All questions were answered to patient satisfaction and they will call for the questions or concerns.    Patient risk factor: Patient is at low risk for infection.         Rut Tapia DPM, Podiatry/Foot and Ankle Surgery

## 2022-03-23 NOTE — PROGRESS NOTES
Orthopedic/Sports Medicine Fracture Triage    Incoming call/or message from referral team member.    Fracture type: Foot.    The patient is in a short boot.    Date of injury 3/22/2022.    Triaged by: Dr. Chu.    Determined to be managed Surgically.    Needs to be seen within 1 week.    Additional Comments/information: Referral stated 1-2 day referral, confirmed with Dr. Chu this patient can wait until Tuesday 3/29 to see Dr. Gonzalez.    Louis Arcos, EMT

## 2022-03-23 NOTE — TELEPHONE ENCOUNTER
Called patient and LVM informing her of appointment on Tuesday 3/29/22 with Dr. Gonzalez. Location and appointment time left as well as call back number.    Lousi Arcos, EMT on 3/23/2022 at 8:53 AM

## 2022-03-24 ENCOUNTER — TELEPHONE (OUTPATIENT)
Dept: PODIATRY | Facility: CLINIC | Age: 37
End: 2022-03-24
Payer: COMMERCIAL

## 2022-03-24 DIAGNOSIS — Z11.59 ENCOUNTER FOR SCREENING FOR OTHER VIRAL DISEASES: Primary | ICD-10-CM

## 2022-03-24 NOTE — TELEPHONE ENCOUNTER
Scheduled surgery    Left message for patient to return call. Secured surgery date for 3/30/22 at [facility].      Dane Gupta, Surgery Scheduler

## 2022-03-24 NOTE — TELEPHONE ENCOUNTER
Spoke to patient. Confirmed surgery information.     Type of surgery: right ORIF fifth metatarsal fracture  Location of surgery: [facility]  Date and time of surgery: 3/30/22  Surgeon: Willie  Pre-Op Appt Date: 3/29/22 at [facility]  Post-Op Appt Date: 4 /5/22  Packet sent out: No  Pre-cert/Authorization completed:  Not Applicable  Date: 3.24.22      Dane Gupta, Surgery Scheduler

## 2022-03-26 ENCOUNTER — LAB (OUTPATIENT)
Dept: LAB | Facility: CLINIC | Age: 37
End: 2022-03-26
Payer: COMMERCIAL

## 2022-03-26 DIAGNOSIS — Z11.59 ENCOUNTER FOR SCREENING FOR OTHER VIRAL DISEASES: ICD-10-CM

## 2022-03-26 PROCEDURE — U0003 INFECTIOUS AGENT DETECTION BY NUCLEIC ACID (DNA OR RNA); SEVERE ACUTE RESPIRATORY SYNDROME CORONAVIRUS 2 (SARS-COV-2) (CORONAVIRUS DISEASE [COVID-19]), AMPLIFIED PROBE TECHNIQUE, MAKING USE OF HIGH THROUGHPUT TECHNOLOGIES AS DESCRIBED BY CMS-2020-01-R: HCPCS

## 2022-03-26 PROCEDURE — U0005 INFEC AGEN DETEC AMPLI PROBE: HCPCS

## 2022-03-28 ENCOUNTER — TELEPHONE (OUTPATIENT)
Dept: NURSING | Facility: CLINIC | Age: 37
End: 2022-03-28
Payer: COMMERCIAL

## 2022-03-28 LAB — SARS-COV-2 RNA RESP QL NAA+PROBE: NEGATIVE

## 2022-03-28 RX ORDER — CHOLECALCIFEROL (VITAMIN D3) 50 MCG
2 TABLET ORAL DAILY
COMMUNITY

## 2022-03-28 RX ORDER — MULTIVITAMIN WITH IRON
1 TABLET ORAL DAILY
COMMUNITY

## 2022-03-28 RX ORDER — ACETAMINOPHEN 500 MG
500-1000 TABLET ORAL EVERY 6 HOURS PRN
COMMUNITY
End: 2022-07-01

## 2022-03-28 NOTE — TELEPHONE ENCOUNTER
Patient calling reporting she had broken her foot last week, is scheduled for surgery 3/30 but is now having new discoloration to the foot. Reports the foot is now a purplish redness where as it was previously a blue green bruise. Patient also reporting a numbness to the top of the foot in an area that is not broken. Requesting provider input regarding if this is ok or if this is something that needs to be addressed prior to surgery. Please advise.     Keisha Noguera RN 03/28/22 11:53 AM   [facility] Triage Nurse Advisor

## 2022-03-28 NOTE — TELEPHONE ENCOUNTER
Return call to patient.  Spoke with patient who states she does not have any new swelling of the foot just different colored bruising and some numbness on the top of the foot.    Informed her of Dr. Tapia's recommendations.  Patient lives in [place]. She states she has a preop appointment in [place] at 8:40am tomorrow. Appointment scheduled for 10 AM with Dr. Tapia in [place].  She is unsure how long the preop appointment will take but she will try to get here is close to 10 AM as possible.    She verbalized understanding and was appreciative of call back.  She had no further questions or concerns at this time.    Rayna Aleman MBA, ATC

## 2022-03-28 NOTE — TELEPHONE ENCOUNTER
Does patient have a lot of swelling to the feet?  I recommend an appointment with me tomorrow before her surgery on Wednesday to assess.      Please let patient know.     Thanks,    Rut Tapia DPM

## 2022-03-30 ENCOUNTER — APPOINTMENT (OUTPATIENT)
Dept: GENERAL RADIOLOGY | Facility: CLINIC | Age: 37
End: 2022-03-30
Attending: PODIATRIST
Payer: COMMERCIAL

## 2022-03-30 ENCOUNTER — HOSPITAL ENCOUNTER (OUTPATIENT)
Facility: CLINIC | Age: 37
Discharge: HOME | End: 2022-03-30
Attending: PODIATRIST | Admitting: PODIATRIST
Payer: COMMERCIAL

## 2022-03-30 ENCOUNTER — ANESTHESIA EVENT (OUTPATIENT)
Dept: SURGERY | Facility: CLINIC | Age: 37
End: 2022-03-30
Payer: COMMERCIAL

## 2022-03-30 ENCOUNTER — ANESTHESIA (OUTPATIENT)
Dept: SURGERY | Facility: CLINIC | Age: 37
End: 2022-03-30
Payer: COMMERCIAL

## 2022-03-30 VITALS
BODY MASS INDEX: 33.76 KG/M2 | OXYGEN SATURATION: 98 % | TEMPERATURE: 98.8 F | HEIGHT: 65 IN | DIASTOLIC BLOOD PRESSURE: 70 MMHG | RESPIRATION RATE: 16 BRPM | HEART RATE: 76 BPM | SYSTOLIC BLOOD PRESSURE: 109 MMHG | WEIGHT: 202.6 LBS

## 2022-03-30 DIAGNOSIS — Z98.890 POST-OPERATIVE STATE: Primary | ICD-10-CM

## 2022-03-30 DIAGNOSIS — S92.311D CLOSED DISPLACED FRACTURE OF FIRST METATARSAL BONE OF RIGHT FOOT WITH ROUTINE HEALING, SUBSEQUENT ENCOUNTER: ICD-10-CM

## 2022-03-30 PROCEDURE — 250N000011 HC RX IP 250 OP 636: Performed by: PODIATRIST

## 2022-03-30 PROCEDURE — 999N000141 HC STATISTIC PRE-PROCEDURE NURSING ASSESSMENT: Performed by: PODIATRIST

## 2022-03-30 PROCEDURE — 272N000002 HC OR SUPPLY OTHER OPNP: Performed by: PODIATRIST

## 2022-03-30 PROCEDURE — 250N000009 HC RX 250: Performed by: ANESTHESIOLOGY

## 2022-03-30 PROCEDURE — 28485 OPTX METATARSAL FX EACH: CPT | Mod: RT | Performed by: PODIATRIST

## 2022-03-30 PROCEDURE — 999N000065 XR FOOT PORT RIGHT 2 VIEWS: Mod: RT

## 2022-03-30 PROCEDURE — 250N000009 HC RX 250: Performed by: PODIATRIST

## 2022-03-30 PROCEDURE — C1713 ANCHOR/SCREW BN/BN,TIS/BN: HCPCS | Performed by: PODIATRIST

## 2022-03-30 PROCEDURE — 250N000011 HC RX IP 250 OP 636: Performed by: NURSE ANESTHETIST, CERTIFIED REGISTERED

## 2022-03-30 PROCEDURE — 258N000003 HC RX IP 258 OP 636: Performed by: ANESTHESIOLOGY

## 2022-03-30 PROCEDURE — 272N000001 HC OR GENERAL SUPPLY STERILE: Performed by: PODIATRIST

## 2022-03-30 PROCEDURE — 999N000179 XR SURGERY CARM FLUORO LESS THAN 5 MIN W STILLS: Mod: TC

## 2022-03-30 PROCEDURE — 370N000017 HC ANESTHESIA TECHNICAL FEE, PER MIN: Performed by: PODIATRIST

## 2022-03-30 PROCEDURE — 710N000012 HC RECOVERY PHASE 2, PER MINUTE: Performed by: PODIATRIST

## 2022-03-30 PROCEDURE — 360N000083 HC SURGERY LEVEL 3 W/ FLUORO, PER MIN: Performed by: PODIATRIST

## 2022-03-30 DEVICE — IMPLANTABLE DEVICE: Type: IMPLANTABLE DEVICE | Site: FOOT | Status: FUNCTIONAL

## 2022-03-30 RX ORDER — CLINDAMYCIN PHOSPHATE 900 MG/50ML
900 INJECTION, SOLUTION INTRAVENOUS SEE ADMIN INSTRUCTIONS
Status: DISCONTINUED | OUTPATIENT
Start: 2022-03-30 | End: 2022-03-30 | Stop reason: HOSPADM

## 2022-03-30 RX ORDER — BUPIVACAINE HYDROCHLORIDE 5 MG/ML
INJECTION, SOLUTION EPIDURAL; INTRACAUDAL PRN
Status: DISCONTINUED | OUTPATIENT
Start: 2022-03-30 | End: 2022-03-30 | Stop reason: HOSPADM

## 2022-03-30 RX ORDER — KETOROLAC TROMETHAMINE 30 MG/ML
INJECTION, SOLUTION INTRAMUSCULAR; INTRAVENOUS PRN
Status: DISCONTINUED | OUTPATIENT
Start: 2022-03-30 | End: 2022-03-30

## 2022-03-30 RX ORDER — AMOXICILLIN 250 MG
1-2 CAPSULE ORAL 2 TIMES DAILY
Qty: 30 TABLET | Refills: 0 | Status: SHIPPED | OUTPATIENT
Start: 2022-03-30 | End: 2022-04-13

## 2022-03-30 RX ORDER — NALOXONE HYDROCHLORIDE 0.4 MG/ML
0.2 INJECTION, SOLUTION INTRAMUSCULAR; INTRAVENOUS; SUBCUTANEOUS
Status: DISCONTINUED | OUTPATIENT
Start: 2022-03-30 | End: 2022-03-30 | Stop reason: HOSPADM

## 2022-03-30 RX ORDER — OXYCODONE HYDROCHLORIDE 5 MG/1
5-10 TABLET ORAL EVERY 4 HOURS PRN
Qty: 20 TABLET | Refills: 0 | Status: SHIPPED | OUTPATIENT
Start: 2022-03-30 | End: 2022-04-13

## 2022-03-30 RX ORDER — PROPOFOL 10 MG/ML
INJECTION, EMULSION INTRAVENOUS CONTINUOUS PRN
Status: DISCONTINUED | OUTPATIENT
Start: 2022-03-30 | End: 2022-03-30

## 2022-03-30 RX ORDER — SODIUM CHLORIDE, SODIUM LACTATE, POTASSIUM CHLORIDE, CALCIUM CHLORIDE 600; 310; 30; 20 MG/100ML; MG/100ML; MG/100ML; MG/100ML
INJECTION, SOLUTION INTRAVENOUS CONTINUOUS
Status: DISCONTINUED | OUTPATIENT
Start: 2022-03-30 | End: 2022-03-30 | Stop reason: HOSPADM

## 2022-03-30 RX ORDER — ONDANSETRON 4 MG/1
4 TABLET, ORALLY DISINTEGRATING ORAL EVERY 30 MIN PRN
Status: DISCONTINUED | OUTPATIENT
Start: 2022-03-30 | End: 2022-03-30 | Stop reason: HOSPADM

## 2022-03-30 RX ORDER — FENTANYL CITRATE 50 UG/ML
25 INJECTION, SOLUTION INTRAMUSCULAR; INTRAVENOUS
Status: DISCONTINUED | OUTPATIENT
Start: 2022-03-30 | End: 2022-03-30 | Stop reason: HOSPADM

## 2022-03-30 RX ORDER — FENTANYL CITRATE 50 UG/ML
INJECTION, SOLUTION INTRAMUSCULAR; INTRAVENOUS PRN
Status: DISCONTINUED | OUTPATIENT
Start: 2022-03-30 | End: 2022-03-30

## 2022-03-30 RX ORDER — CLINDAMYCIN PHOSPHATE 900 MG/50ML
900 INJECTION, SOLUTION INTRAVENOUS
Status: DISCONTINUED | OUTPATIENT
Start: 2022-03-30 | End: 2022-03-30 | Stop reason: HOSPADM

## 2022-03-30 RX ORDER — ONDANSETRON 4 MG/1
4-8 TABLET, ORALLY DISINTEGRATING ORAL EVERY 8 HOURS PRN
Qty: 4 TABLET | Refills: 0 | Status: SHIPPED | OUTPATIENT
Start: 2022-03-30 | End: 2022-04-13

## 2022-03-30 RX ORDER — HYDROXYZINE PAMOATE 25 MG/1
25 CAPSULE ORAL 3 TIMES DAILY PRN
Qty: 30 CAPSULE | Refills: 0 | Status: SHIPPED | OUTPATIENT
Start: 2022-03-30 | End: 2022-04-13

## 2022-03-30 RX ORDER — NALOXONE HYDROCHLORIDE 0.4 MG/ML
0.4 INJECTION, SOLUTION INTRAMUSCULAR; INTRAVENOUS; SUBCUTANEOUS
Status: DISCONTINUED | OUTPATIENT
Start: 2022-03-30 | End: 2022-03-30 | Stop reason: HOSPADM

## 2022-03-30 RX ORDER — MEPERIDINE HYDROCHLORIDE 25 MG/ML
12.5 INJECTION INTRAMUSCULAR; INTRAVENOUS; SUBCUTANEOUS
Status: DISCONTINUED | OUTPATIENT
Start: 2022-03-30 | End: 2022-03-30 | Stop reason: HOSPADM

## 2022-03-30 RX ORDER — ONDANSETRON 2 MG/ML
4 INJECTION INTRAMUSCULAR; INTRAVENOUS EVERY 30 MIN PRN
Status: DISCONTINUED | OUTPATIENT
Start: 2022-03-30 | End: 2022-03-30 | Stop reason: HOSPADM

## 2022-03-30 RX ORDER — OXYCODONE HYDROCHLORIDE 5 MG/1
5 TABLET ORAL
Status: DISCONTINUED | OUTPATIENT
Start: 2022-03-30 | End: 2022-03-30 | Stop reason: HOSPADM

## 2022-03-30 RX ORDER — PROPOFOL 10 MG/ML
INJECTION, EMULSION INTRAVENOUS PRN
Status: DISCONTINUED | OUTPATIENT
Start: 2022-03-30 | End: 2022-03-30

## 2022-03-30 RX ORDER — LIDOCAINE 40 MG/G
CREAM TOPICAL
Status: DISCONTINUED | OUTPATIENT
Start: 2022-03-30 | End: 2022-03-30 | Stop reason: HOSPADM

## 2022-03-30 RX ORDER — HYDROMORPHONE HCL IN WATER/PF 6 MG/30 ML
0.2 PATIENT CONTROLLED ANALGESIA SYRINGE INTRAVENOUS EVERY 5 MIN PRN
Status: DISCONTINUED | OUTPATIENT
Start: 2022-03-30 | End: 2022-03-30 | Stop reason: HOSPADM

## 2022-03-30 RX ORDER — OXYCODONE HYDROCHLORIDE 5 MG/1
5 TABLET ORAL EVERY 4 HOURS PRN
Status: DISCONTINUED | OUTPATIENT
Start: 2022-03-30 | End: 2022-03-30 | Stop reason: ALTCHOICE

## 2022-03-30 RX ORDER — ONDANSETRON 2 MG/ML
INJECTION INTRAMUSCULAR; INTRAVENOUS PRN
Status: DISCONTINUED | OUTPATIENT
Start: 2022-03-30 | End: 2022-03-30

## 2022-03-30 RX ORDER — FENTANYL CITRATE 50 UG/ML
25 INJECTION, SOLUTION INTRAMUSCULAR; INTRAVENOUS EVERY 5 MIN PRN
Status: DISCONTINUED | OUTPATIENT
Start: 2022-03-30 | End: 2022-03-30 | Stop reason: HOSPADM

## 2022-03-30 RX ADMIN — PROPOFOL 120 MCG/KG/MIN: 10 INJECTION, EMULSION INTRAVENOUS at 11:48

## 2022-03-30 RX ADMIN — ONDANSETRON HYDROCHLORIDE 4 MG: 2 INJECTION, SOLUTION INTRAVENOUS at 12:06

## 2022-03-30 RX ADMIN — PROPOFOL 50 MG: 10 INJECTION, EMULSION INTRAVENOUS at 11:48

## 2022-03-30 RX ADMIN — FENTANYL CITRATE 25 MCG: 50 INJECTION, SOLUTION INTRAMUSCULAR; INTRAVENOUS at 11:48

## 2022-03-30 RX ADMIN — SODIUM CHLORIDE, POTASSIUM CHLORIDE, SODIUM LACTATE AND CALCIUM CHLORIDE: 600; 310; 30; 20 INJECTION, SOLUTION INTRAVENOUS at 11:47

## 2022-03-30 RX ADMIN — FENTANYL CITRATE 50 MCG: 50 INJECTION, SOLUTION INTRAMUSCULAR; INTRAVENOUS at 11:55

## 2022-03-30 RX ADMIN — CLINDAMYCIN PHOSPHATE 900 MG: 900 INJECTION, SOLUTION INTRAVENOUS at 09:32

## 2022-03-30 RX ADMIN — FENTANYL CITRATE 25 MCG: 50 INJECTION, SOLUTION INTRAMUSCULAR; INTRAVENOUS at 11:52

## 2022-03-30 RX ADMIN — LIDOCAINE HYDROCHLORIDE 25 MG: 10 INJECTION, SOLUTION EPIDURAL; INFILTRATION; INTRACAUDAL; PERINEURAL at 11:50

## 2022-03-30 RX ADMIN — MIDAZOLAM 2 MG: 1 INJECTION INTRAMUSCULAR; INTRAVENOUS at 11:45

## 2022-03-30 RX ADMIN — KETOROLAC TROMETHAMINE 30 MG: 30 INJECTION, SOLUTION INTRAMUSCULAR at 12:37

## 2022-03-30 NOTE — BRIEF OP NOTE
[facility]    Brief Operative Note    Pre-operative diagnosis: Right foot pain [M79.341]  Displaced fracture of fifth metatarsal bone, right foot, initial encounter for closed fracture [S92.103A]  Post-operative diagnosis Same as pre-operative diagnosis    Procedure: Procedure(s):  Open reduction internal fixation right fifth metatarsal fracture  Surgeon: Surgeon(s) and Role:     * Rut Tapia DPM, Podiatry/Foot and Ankle Surgery - Primary  Anesthesia: Combined MAC with Local   Estimated Blood Loss: Less than 10 ml    Drains: None  Specimens: * No specimens in log *  Findings:   None.  Complications: None.  Implants:   Implant Name Type Inv. Item Serial No.  Lot No. LRB No. Used Action   2.4mm Low Profile Screw, Ti, Cortex, 16mm    ARTHREX [id number] Right 1 Implanted   5 Hole T-Plate    [employer] [id number] Right 1 Implanted   2.4mm Marcia Screw, Ti, 10mm    AR[employer] [id number] Right 3 Implanted

## 2022-03-30 NOTE — DISCHARGE INSTRUCTIONS
GENERAL ANESTHESIA OR SEDATION ADULT DISCHARGE INSTRUCTIONS   SPECIAL PRECAUTIONS FOR 24 HOURS AFTER SURGERY    IT IS NOT UNUSUAL TO FEEL LIGHT-HEADED OR FAINT, UP TO 24 HOURS AFTER SURGERY OR WHILE TAKING PAIN MEDICATION.  IF YOU HAVE THESE SYMPTOMS; SIT FOR A FEW MINUTES BEFORE STANDING AND HAVE SOMEONE ASSIST YOU WHEN YOU GET UP TO WALK OR USE THE BATHROOM.    YOU SHOULD REST AND RELAX FOR THE NEXT 24 HOURS AND YOU MUST MAKE ARRANGEMENTS TO HAVE SOMEONE STAY WITH YOU FOR AT LEAST 24 HOURS AFTER YOUR DISCHARGE.  AVOID HAZARDOUS AND STRENUOUS ACTIVITIES.  DO NOT MAKE IMPORTANT DECISIONS FOR 24 HOURS.    DO NOT DRIVE ANY VEHICLE OR OPERATE MECHANICAL EQUIPMENT FOR 24 HOURS FOLLOWING THE END OF YOUR SURGERY.  EVEN THOUGH YOU MAY FEEL NORMAL, YOUR REACTIONS MAY BE AFFECTED BY THE MEDICATION YOU HAVE RECEIVED.    DO NOT DRINK ALCOHOLIC BEVERAGES FOR 24 HOURS FOLLOWING YOUR SURGERY.    DRINK CLEAR LIQUIDS (APPLE JUICE, GINGER ALE, 7-UP, BROTH, ETC.).  PROGRESS TO YOUR REGULAR DIET AS YOU FEEL ABLE.    YOU MAY HAVE A DRY MOUTH, A SORE THROAT, MUSCLES ACHES OR TROUBLE SLEEPING.  THESE SHOULD GO AWAY AFTER 24 HOURS.    CALL YOUR DOCTOR FOR ANY OF THE FOLLOWING:  SIGNS OF INFECTION (FEVER, GROWING TENDERNESS AT THE SURGERY SITE, A LARGE AMOUNT OF DRAINAGE OR BLEEDING, SEVERE PAIN, FOUL-SMELLING DRAINAGE, REDNESS OR SWELLING.    IT HAS BEEN OVER 8 TO 10 HOURS SINCE SURGERY AND YOU ARE STILL NOT ABLE TO URINATE (PASS WATER).       DR. RHEA MANCILLA DPM    Podiatry Triage Phone Number:  [phone]    Patient Care Nurses In Call (After Hours):  [phone]

## 2022-03-30 NOTE — OP NOTE
Procedure Date: 03/30/2022    SURGEON:  Rut Tapia DPM.    PREOPERATIVE DIAGNOSIS:  Closed displaced right fifth metatarsal fracture.    POSTOPERATIVE DIAGNOSIS:  Closed displaced right fifth metatarsal fracture.    PROCEDURE:  Open reduction and internal fixation of right fifth metatarsal fracture.    ANESTHESIA:  MAC with local.    HEMOSTASIS:  Pneumatic ankle tourniquet and electrocautery.    ESTIMATED BLOOD LOSS:  5 mL.    SPECIMENS:  None.    MATERIALS:  One Arthrex 2.4 fully threaded screw and 1 Arthrex straight plate with 3 locking screws.    INDICATIONS FOR PROCEDURE:  Ms. Carroll is a 36-year-old female that presented to clinic with painful broken right foot.  She had stepped wrong 2 days prior and heard a crack.  She was seen in Urgent Care, which showed a displaced fifth metatarsal fracture.  She followed up with me in clinic for surgical options.  On physical exam, patient's pulses are palpable.  She does have moderate edema to the right foot.  X-rays show displacement of the right fifth metatarsal fracture pieces about 5 mm.  It was discussed with the patient to go in and surgically correct this to get it better aligned to help with healing.  Risks, benefits, and complications were discussed with the patient and she wishes to proceed with surgery.  No guarantees were made.    DESCRIPTION OF PROCEDURE:  The patient was brought to the operating room and placed on the operating table in supine position.  Anesthesia was administered and local was injected.  The foot was prepped and draped using sterile technique.  Attention was directed to the dorsolateral aspect of the right foot.  The tourniquet was inflated to 250 mmHg and a linear incision was made over the fifth metatarsal, approximately 6 cm in length through skin.  Blunt dissection was used down to the level of the periosteum and capsule.  All vessels that were noted were ligated with electrocautery.  Once down to the periosteum and capsule, this  was incised with a #15 blade.  The tissue was sharply dissected off of the fifth metatarsal, exposing the fracture.  The soft callus was then curetted out and the fracture was realigned and stabilized with a bone reduction forceps.  A 2.4 Arthrex fully threaded screw using lag technique was used for compression screw to hold the fracture fragments together and an Arthrex straight plate with 3 locking screws was placed on the lateral aspect of the fifth metatarsal for increased stability.  This was visualized under fluoroscan and noted to be in good position with the fracture corrected.  The wound was flushed with copious amounts of normal saline.  The periosteum was reapproximated using 3-0 Vicryl and the skin was reapproximated with 4-0 Prolene.  The patient's foot was placed in a dry sterile dressing.  The patient tolerated the procedure and anesthesia well and was transferred to recovery with vital signs stable and vascular status intact.  The patient will be nonweightbearing.  She was given oxycodone for pain.    Rut Tapia DPM        D: 2022   T: 2022   MT: [id number]    Name:     GALINA RAMIREZ VELMA  MRN:      [id number]        Account:        [id number]   :      1985           Procedure Date: 2022     Document: [id number]

## 2022-03-30 NOTE — INTERVAL H&P NOTE
"I have reviewed the surgical (or preoperative) H&P that is linked to this encounter, and examined the patient. There are no significant changes    Clinical Conditions Present on Arrival:  Clinically Significant Risk Factors Present on Admission                    # Obesity: Estimated body mass index is 33.71 kg/m  as calculated from the following:    Height as of this encounter: 1.651 m (5' 5\").    Weight as of this encounter: 91.9 kg (202 lb 9.6 oz).       "

## 2022-03-30 NOTE — ANESTHESIA CARE TRANSFER NOTE
Patient: Keisha Carroll    Procedure: Procedure(s):  Open reduction internal fixation right fifth metatarsal fracture       Diagnosis: Right foot pain [M79.671]  Displaced fracture of fifth metatarsal bone, right foot, initial encounter for closed fracture [S92.351A]  Diagnosis Additional Information: No value filed.    Anesthesia Type:   MAC     Note:    Oropharynx: oropharynx clear of all foreign objects and spontaneously breathing  Level of Consciousness: awake and drowsy  Oxygen Supplementation: room air    Independent Airway: airway patency satisfactory and stable  Dentition: dentition unchanged  Vital Signs Stable: post-procedure vital signs reviewed and stable  Report to RN Given: handoff report given  Patient transferred to: Phase II  Comments: No issues, awake and comfortable.  Handoff Report: Identifed the Patient, Identified the Reponsible Provider, Reviewed the pertinent medical history, Discussed the surgical course, Reviewed Intra-OP anesthesia mangement and issues during anesthesia and Allowed opportunity for questions and acknowledgement of understanding      Vitals:  Vitals Value Taken Time   BP     Temp     Pulse     Resp     SpO2         Electronically Signed By: TALI Duque CRNA  March 30, 2022  12:58 PM

## 2022-03-30 NOTE — ANESTHESIA POSTPROCEDURE EVALUATION
Patient: Keisha Carroll    Procedure: Procedure(s):  Open reduction internal fixation right fifth metatarsal fracture       Anesthesia Type:  MAC    Note:  Disposition: Outpatient   Postop Pain Control: Uneventful            Sign Out: Well controlled pain   PONV: No   Neuro/Psych: Uneventful            Sign Out: Acceptable/Baseline neuro status   Airway/Respiratory: Uneventful            Sign Out: Acceptable/Baseline resp. status   CV/Hemodynamics: Uneventful            Sign Out: Acceptable CV status; No obvious hypovolemia; No obvious fluid overload   Other NRE: NONE   DID A NON-ROUTINE EVENT OCCUR? No           Last vitals:  Vitals Value Taken Time   /75 03/30/22 1330   Temp 98.2  F (36.8  C) 03/30/22 1257   Pulse 68 03/30/22 1330   Resp 12 03/30/22 1330   SpO2 97 % 03/30/22 1330       Electronically Signed By: Luis Miguel Kiser MD  March 30, 2022  1:59 PM

## 2022-03-30 NOTE — ANESTHESIA PREPROCEDURE EVALUATION
Anesthesia Pre-Procedure Evaluation    Patient: Keisha Carroll   MRN: [id number] : 1985        Procedure : Procedure(s):  Open reduction internal fixation right fifth metatarsal fracture          History reviewed. No pertinent past medical history.   Past Surgical History:   Procedure Laterality Date     MOUTH SURGERY      wisdom teeth     SOFT TISSUE SURGERY      birth dilia removal under general anesthesia age 14      Allergies   Allergen Reactions     Amoxicillin Hives      Social History     Tobacco Use     Smoking status: Never Smoker     Smokeless tobacco: Never Used   Substance Use Topics     Alcohol use: Yes     Comment: 1 drink once or twice a week       Wt Readings from Last 1 Encounters:   22 91.9 kg (202 lb 9.6 oz)        Anesthesia Evaluation            ROS/MED HX  ENT/Pulmonary:     (+) asthma     Neurologic:  - neg neurologic ROS     Cardiovascular:  - neg cardiovascular ROS     METS/Exercise Tolerance:     Hematologic:  - neg hematologic  ROS     Musculoskeletal:   (+) fracture,     GI/Hepatic:  - neg GI/hepatic ROS     Renal/Genitourinary:  - neg Renal ROS     Endo:     (+) Obesity,     Psychiatric/Substance Use:  - neg psychiatric ROS     Infectious Disease:  - neg infectious disease ROS     Malignancy:  - neg malignancy ROS     Other:  - neg other ROS          Physical Exam    Airway        Mallampati: II   TM distance: > 3 FB   Neck ROM: full     Respiratory Devices and Support         Dental           Cardiovascular   cardiovascular exam normal          Pulmonary   pulmonary exam normal                OUTSIDE LABS:  CBC: No results found for: WBC, HGB, HCT, PLT  BMP: No results found for: NA, POTASSIUM, CHLORIDE, CO2, BUN, CR, GLC  COAGS: No results found for: PTT, INR, FIBR  POC: No results found for: BGM, HCG, HCGS  HEPATIC: No results found for: ALBUMIN, PROTTOTAL, ALT, AST, GGT, ALKPHOS, BILITOTAL, BILIDIRECT, CATRACHO  OTHER: No results found for: PH, LACT, A1C, CAROLEE, PHOS,  MAG, LIPASE, AMYLASE, TSH, T4, T3, CRP, SED    Anesthesia Plan    ASA Status:  2      Anesthesia Type: MAC.              Consents    Anesthesia Plan(s) and associated risks, benefits, and realistic alternatives discussed. Questions answered and patient/representative(s) expressed understanding.    - Discussed:     - Discussed with:  Patient      - Extended Intubation/Ventilatory Support Discussed: No.      - Patient is DNR/DNI Status: No    Use of blood products discussed: No .     Postoperative Care    Pain management: IV analgesics, Oral pain medications.   PONV prophylaxis: Ondansetron (or other 5HT-3), Background Propofol Infusion     Comments:                Luis Miguel Kiser MD

## 2022-04-05 ENCOUNTER — HOSPITAL ENCOUNTER (OUTPATIENT)
Dept: ULTRASOUND IMAGING | Facility: CLINIC | Age: 37
Discharge: HOME | End: 2022-04-05
Attending: PODIATRIST | Admitting: PODIATRIST
Payer: COMMERCIAL

## 2022-04-05 ENCOUNTER — OFFICE VISIT (OUTPATIENT)
Dept: PODIATRY | Facility: CLINIC | Age: 37
End: 2022-04-05
Payer: COMMERCIAL

## 2022-04-05 VITALS
SYSTOLIC BLOOD PRESSURE: 120 MMHG | HEIGHT: 65 IN | DIASTOLIC BLOOD PRESSURE: 70 MMHG | BODY MASS INDEX: 33.66 KG/M2 | WEIGHT: 202 LBS

## 2022-04-05 DIAGNOSIS — Z98.890 S/P FOOT SURGERY, RIGHT: ICD-10-CM

## 2022-04-05 DIAGNOSIS — M79.661 RIGHT CALF PAIN: ICD-10-CM

## 2022-04-05 DIAGNOSIS — M79.661 RIGHT CALF PAIN: Primary | ICD-10-CM

## 2022-04-05 DIAGNOSIS — S92.351A DISPLACED FRACTURE OF FIFTH METATARSAL BONE, RIGHT FOOT, INITIAL ENCOUNTER FOR CLOSED FRACTURE: ICD-10-CM

## 2022-04-05 PROCEDURE — 99024 POSTOP FOLLOW-UP VISIT: CPT | Performed by: PODIATRIST

## 2022-04-05 PROCEDURE — 93971 EXTREMITY STUDY: CPT | Mod: RT

## 2022-04-05 RX ORDER — HYDROXYZINE HYDROCHLORIDE 25 MG/1
25 TABLET, FILM COATED ORAL EVERY 6 HOURS PRN
Qty: 12 TABLET | Refills: 0 | Status: SHIPPED | OUTPATIENT
Start: 2022-04-05 | End: 2022-07-01

## 2022-04-05 RX ORDER — HYDROCODONE BITARTRATE AND ACETAMINOPHEN 5; 325 MG/1; MG/1
1 TABLET ORAL EVERY 6 HOURS PRN
Qty: 12 TABLET | Refills: 0 | Status: SHIPPED | OUTPATIENT
Start: 2022-04-05 | End: 2022-04-08

## 2022-04-05 NOTE — PROGRESS NOTES
"Foot & Ankle Surgery  April 5, 2022    S:  Patient in today sp ORIF right 5th metatarsal fracture by Dr Tapia on 3/30/22.  Pain levels elevated.  Following postop instructions.  She did mention some discomfort in her right calf    /70   Ht 1.651 m (5' 5\")   Wt 91.6 kg (202 lb)   LMP  (LMP Unknown)   BMI 33.61 kg/m        ROS - positive for CC.  Patient denies current nausea, vomiting, chills, fevers, belly pain, calf pain, chest pain or SOB.  Complete remainder of ROS is otherwise neg.    PE -sutures intact, skin margins well coapted.  No dehiscence, gapping or signs of infection.  Mild swelling but within normal limits for the stage postop..  Skin shows no trophic, color or temperature changes otherwise.  No calf redness, swelling or pain noted on her exam today.    Imaging -postop x-rays - Impression:     1.  Postoperative changes of fifth metatarsal fracture fixation with a  traversing screw and a lateral plate with screw fixation. The fracture  is reduced to anatomic position and alignment. The fracture lucency is  difficult to visualize due to reduction and overlapping bony  structures on the various projections. Moderate soft tissue swelling  in the foot.     2.  No new or other significant bone or joint abnormality.    A/P - 36 year old yo patient approx 1 week sp above procedure  -We reviewed the postoperative x-rays demonstrating hardware and fracture reduction  -Continue all postoperative instructions  -She was given prescription for Norco and Atarax for pain control  -Ultrasound duplex scan ordered right lower extremity to assess for DVT.    Follow up  -4/13/2022 with Dr. Tapia Or sooner with acute issues    Plan for [id number] - once healed sufficiently.       Bon Chi DPM FACFAS FACFAOM  Podiatric Foot & Ankle Surgeon  [facility]  [phone]    Disclaimer: This note consists of symbols derived from keyboarding, dictation and/or voice recognition software. As a " result, there may be errors in the script that have gone undetected. Please consider this when interpreting information found in this chart.

## 2022-04-13 ENCOUNTER — OFFICE VISIT (OUTPATIENT)
Dept: PODIATRY | Facility: CLINIC | Age: 37
End: 2022-04-13

## 2022-04-13 ENCOUNTER — ANCILLARY PROCEDURE (OUTPATIENT)
Dept: GENERAL RADIOLOGY | Facility: CLINIC | Age: 37
End: 2022-04-13
Attending: PODIATRIST
Payer: COMMERCIAL

## 2022-04-13 VITALS
WEIGHT: 202 LBS | HEIGHT: 65 IN | BODY MASS INDEX: 33.66 KG/M2 | SYSTOLIC BLOOD PRESSURE: 112 MMHG | DIASTOLIC BLOOD PRESSURE: 64 MMHG

## 2022-04-13 DIAGNOSIS — Z98.890 S/P FOOT SURGERY, RIGHT: ICD-10-CM

## 2022-04-13 DIAGNOSIS — Z98.890 S/P FOOT SURGERY, RIGHT: Primary | ICD-10-CM

## 2022-04-13 PROCEDURE — 73630 X-RAY EXAM OF FOOT: CPT | Mod: RT | Performed by: RADIOLOGY

## 2022-04-13 PROCEDURE — 99024 POSTOP FOLLOW-UP VISIT: CPT | Performed by: PODIATRIST

## 2022-04-13 NOTE — PROGRESS NOTES
"Podiatry / Foot and Ankle Surgery Progress Note    April 13, 2022    Subject: Patient was seen for 2 weeks status post right open reduction internal fixation of fifth metatarsal fracture.  Notes that she is doing okay.  Does have some throbbing especially when the foot is down.  Denies fever, nausea, vomiting.  No other concerns today.    Objective:  Vitals: /64   Ht 1.651 m (5' 5\")   Wt 91.6 kg (202 lb)   LMP  (LMP Unknown)   BMI 33.61 kg/m      General:  Patient is alert and orientated.  NAD.    Vascular:  DP and PT pulses are palpable.  No edema or varicosities noted.  CFT's < 3secs.  Skin temp is normal.    Neuro:  Light and gross touch sensation intact to digits, dorsum, and plantar aspects of the feet.    Derm: Dressing is clean dry and intact.  Sutures are intact.  No redness, dehiscence or signs of acute infection noted.  Musculoskeletal:  No foot deformity noted.      Imaging: Right foot x-ray I personally reviewed the xrays -hardware is in good position and the fracture is well aligned.  Otherwise unremarkable.    Assessment: S/P foot surgery, right    Medical Decision Making/Plan: At this time the sutures were removed.  Patient can get the foot wet.  She can lotion the foot.  She can go back to regular socks.  She will continue minimal weightbearing in the postop boot for the next month.  We will start some physical therapy to help with strengthening her legs and ankles.  She will follow-up in 1 month for repeat x-ray and reassessment.  All questions were answered to patient satisfaction she will call for the questions or concerns.      Patient Risk Factor:  Patient is a low risk factor for infection.     Rut Tapia DPM, Podiatry/Foot and Ankle Surgery    "

## 2022-04-13 NOTE — PATIENT INSTRUCTIONS
Thank you for choosing [facility] Podiatry / Foot & Ankle Surgery!    DR MANCILLA'S CLINIC:  [facility]   [address]   [place] [address]      TRIAGE LINE: [phone]  APPOINTMENTS: [phone]  RADIOLOGY: [phone]  SET UP SURGERY: [phone]  BILLING QUESTIONS: [phone]  FAX: [phone]       Follow up: 4 weeks    PHYSICAL THERAPY REFERRAL  [place] [facility] ([facility])  Located in most [facility]  Appointments: [phone]     SCAR CARE PROTOCOL  Scarring is an unfortunate but unavoidable part of surgery.  Every person scars differently and there is no way to predict how an individual's final scar will look.  Now that the sutures have been removed one can begin taking some steps to help minimize the appearance of scarring.    WOUND HEALING  As soon as the skin is incised during surgery, the body is taking steps to prepare for healing. After about 3 days, the body has sent cells to the incision to begin the healing process. These cells, called fibroblasts, make collagen, a protein in the skin that helps provide strength. Once the skin has been sufficiently strengthened, the sutures are removed. Over the next year, the body synthesizes new collagen and breaks down old collagen to help achieve a strong scar that allows the foot/ankle to function appropriately. This is where patients can help the appearance of the scar, as it will change over the next year.    STEPS  1. Do not expose the scar to the sun for 1 year.  2. Any sun exposure may permanently darken the appearance of the scar.  3. Wear shoes/socks or cover your scar with zinc oxide.  4. Massage the scar 2-3 times per day.  -Massage the entire length of the scar with gentle to moderate pressure.  -Pressure can help flatten the scar.  5. Lotion/Vitamin E helps keep the tissue soft.  6. Try over the counter scar products such as Mederma or Scar Zone.  -These are available at any pharmacy  without a prescription.  -Patients must use these for extended periods of time (6-12 months) to see a difference.

## 2022-04-13 NOTE — LETTER
"    4/13/2022         RE: Keisha Carroll  [address]  [facility] [address]        Dear Colleague,    Thank you for referring your patient, Keisha Carroll, to the [facility] POD[facility]. Please see a copy of my visit note below.    Podiatry / Foot and Ankle Surgery Progress Note    April 13, 2022    Subject: Patient was seen for 2 weeks status post right open reduction internal fixation of fifth metatarsal fracture.  Notes that she is doing okay.  Does have some throbbing especially when the foot is down.  Denies fever, nausea, vomiting.  No other concerns today.    Objective:  Vitals: /64   Ht 1.651 m (5' 5\")   Wt 91.6 kg (202 lb)   LMP  (LMP Unknown)   BMI 33.61 kg/m      General:  Patient is alert and orientated.  NAD.    Vascular:  DP and PT pulses are palpable.  No edema or varicosities noted.  CFT's < 3secs.  Skin temp is normal.    Neuro:  Light and gross touch sensation intact to digits, dorsum, and plantar aspects of the feet.    Derm: Dressing is clean dry and intact.  Sutures are intact.  No redness, dehiscence or signs of acute infection noted.  Musculoskeletal:  No foot deformity noted.      Imaging: Right foot x-ray I personally reviewed the xrays -hardware is in good position and the fracture is well aligned.  Otherwise unremarkable.    Assessment: S/P foot surgery, right    Medical Decision Making/Plan: At this time the sutures were removed.  Patient can get the foot wet.  She can lotion the foot.  She can go back to regular socks.  She will continue minimal weightbearing in the postop boot for the next month.  We will start some physical therapy to help with strengthening her legs and ankles.  She will follow-up in 1 month for repeat x-ray and reassessment.  All questions were answered to patient satisfaction she will call for the questions or concerns.      Patient Risk Factor:  Patient is a low risk factor for infection.     Rut Tapia DPM, " Podiatry/Foot and Ankle Surgery        Again, thank you for allowing me to participate in the care of your patient.        Sincerely,        Rut Tapia DPM, Podiatry/Foot and Ankle Surgery

## 2022-04-14 ENCOUNTER — TELEPHONE (OUTPATIENT)
Dept: NEUROLOGY | Facility: CLINIC | Age: 37
End: 2022-04-14
Payer: COMMERCIAL

## 2022-04-14 NOTE — TELEPHONE ENCOUNTER
[facility] Call Center    Phone Message    May a detailed message be left on voicemail: yes     Reason for Call: Other: pt called to schedule appt with Dr. Daniel. Please add to wait list, she will be a new pt.    Action Taken: Message routed to:  [facility] neurology    Travel Screening: Not Applicable

## 2022-05-06 ENCOUNTER — THERAPY VISIT (OUTPATIENT)
Dept: PHYSICAL THERAPY | Facility: CLINIC | Age: 37
End: 2022-05-06
Attending: PODIATRIST
Payer: COMMERCIAL

## 2022-05-06 DIAGNOSIS — Z98.890 S/P FOOT SURGERY, RIGHT: ICD-10-CM

## 2022-05-06 PROCEDURE — 97161 PT EVAL LOW COMPLEX 20 MIN: CPT | Mod: GP | Performed by: PHYSICAL THERAPIST

## 2022-05-06 PROCEDURE — 97530 THERAPEUTIC ACTIVITIES: CPT | Mod: GP | Performed by: PHYSICAL THERAPIST

## 2022-05-06 PROCEDURE — 97110 THERAPEUTIC EXERCISES: CPT | Mod: GP | Performed by: PHYSICAL THERAPIST

## 2022-05-06 NOTE — PROGRESS NOTES
Physical Therapy Initial Evaluation: Subjective History  Date of Surgery: 3/30/2022.    Surgical Procedure/Limb: ORIF to 5th metatarsal  Surgeon Name: Dr. Tapia  Average Daily Pain Levels: 3-4/10 (Location: Lateral foot, behind the lateral malleolus; Quality: Aching/Throbbing)  Other Symptoms: Denies numbness/tingling, swelling   Symptom Mgmt Strategies: Minimal - boot occasionally  Prior orthopaedic history/procedures: Thinks she had a stress fracture in her left  Prior non-operative management: See Epic  Next MD Appt Date: PRN    Functional limitations following procedure: Walking, stairs, hula-hoops  Previous level of function: Independent, had just started to run again    Patient Employment: [employer]  Typical Physical Activities: Active with 2     Post-Operative Physical Therapy Examination    Physical Mobility Status  Gait: Arrived with CAM boot, altered gait pattern due to presence of the boot  Transfers:  Independent     Anthropometric Measures     Right Left Difference   Joint ROM      Dorsiflexion 10 deg 15 deg 5 deg   Plantarflexion 40 deg WNL deg - deg   Inversion 15 deg 25 deg 10 deg   Eversion 5 deg 15 deg 10 deg   Circumferential Measures      Figure 8      15 cm Prox          Status of Incision: Clean & healing and scabbing still present over the lateral side of the foot.   Fullness + swelling noted over the toes and dorsum of the foot.     Assessment/Plan    Patient is a 36 year old female with right side ankle complaints.    Patient has the following significant findings with corresponding treatment plan.                Diagnosis 1:  5th met fracture with ORIF  Pain -  hot/cold therapy, manual therapy, splint/taping/bracing/orthotics, self management and education  Decreased ROM/flexibility - manual therapy, therapeutic exercise and home program  Decreased strength - therapeutic exercise, therapeutic activities and home program  Impaired balance - neuro re-education, therapeutic  activities and home program  Impaired gait - gait training and home program  Impaired muscle performance - neuro re-education and home program  Decreased function - therapeutic activities and home program    Therapy Evaluation Codes:   1) History comprised of:   Personal factors that impact the plan of care:      None.    Comorbidity factors that impact the plan of care are:      None.     Medications impacting care: None.  2) Examination of Body Systems comprised of:   Body structures and functions that impact the plan of care:      Ankle.   Activity limitations that impact the plan of care are:      Squatting/kneeling, Stairs, Standing, Walking and Working.  3) Clinical presentation characteristics are:   Stable/Uncomplicated.  4) Decision-Making    Low complexity using standardized patient assessment instrument and/or measureable assessment of functional outcome.  Cumulative Therapy Evaluation is: Low complexity.    Previous and current functional limitations:  (See Goal Flow Sheet for this information)    Short term and Long term goals: (See Goal Flow Sheet for this information)     Communication ability:  Patient appears to be able to clearly communicate and understand verbal and written communication and follow directions correctly.  Treatment Explanation - The following has been discussed with the patient:   RX ordered/plan of care  Anticipated outcomes  Possible risks and side effects  This patient would benefit from PT intervention to resume normal activities.   Rehab potential is good.    Frequency:  1 X week, once daily  Duration:  for 6 weeks  Discharge Plan:  Achieve all LTG.  Independent in home treatment program.  Reach maximal therapeutic benefit.    Please refer to the daily flowsheet for treatment today, total treatment time and time spent performing 1:1 timed codes.

## 2022-05-16 ENCOUNTER — THERAPY VISIT (OUTPATIENT)
Dept: PHYSICAL THERAPY | Facility: CLINIC | Age: 37
End: 2022-05-16
Attending: PODIATRIST
Payer: COMMERCIAL

## 2022-05-16 DIAGNOSIS — Z98.890 S/P FOOT SURGERY, RIGHT: Primary | ICD-10-CM

## 2022-05-16 PROCEDURE — 97110 THERAPEUTIC EXERCISES: CPT | Mod: GP | Performed by: PHYSICAL THERAPIST

## 2022-05-20 NOTE — TELEPHONE ENCOUNTER
Action 5/20/22 MV 7.59am   Action Taken Records and imaging request faxed to N    5/22/22 MV 10.50am  Images resolved in PACS - waiting for recs    5/23/22 MV 2.20pm  recs received and sent to scanning         RECORDS RECEIVED FROM: self    REASON FOR VISIT: MS   Date of Appt: 7/1/22   NOTES (FOR ALL VISITS) STATUS DETAILS   OFFICE NOTE from other specialist Received Dr Jose Rankin @ [facility]:  1/25/22 11/11/21 6/8/21 5/18/21 4/14/21   MEDICATION LIST Care Everywhere    IMAGING  (FOR ALL VISITS)     EMG Received [facility]:  11/23/21   LUMBAR PUNCTURE Care Everywhere [facility]:  9/15/21   MRI (HEAD, NECK, SPINE) Received  [facility]:  MRI Brain 5/15/21  MRI Cervical Spine 5/15/21

## 2022-05-29 ENCOUNTER — HEALTH MAINTENANCE LETTER (OUTPATIENT)
Age: 37
End: 2022-05-29

## 2022-06-09 ENCOUNTER — TRANSFERRED RECORDS (OUTPATIENT)
Dept: MULTI SPECIALTY CLINIC | Facility: CLINIC | Age: 37
End: 2022-06-09

## 2022-06-09 LAB
HPV ABSTRACT: NORMAL
PAP-ABSTRACT: NORMAL

## 2022-07-01 ENCOUNTER — OFFICE VISIT (OUTPATIENT)
Dept: NEUROLOGY | Facility: CLINIC | Age: 37
End: 2022-07-01
Attending: INTERNAL MEDICINE
Payer: COMMERCIAL

## 2022-07-01 ENCOUNTER — LAB (OUTPATIENT)
Dept: LAB | Facility: CLINIC | Age: 37
End: 2022-07-01
Payer: COMMERCIAL

## 2022-07-01 ENCOUNTER — PRE VISIT (OUTPATIENT)
Dept: NEUROLOGY | Facility: CLINIC | Age: 37
End: 2022-07-01
Payer: COMMERCIAL

## 2022-07-01 VITALS
OXYGEN SATURATION: 97 % | WEIGHT: 201.9 LBS | SYSTOLIC BLOOD PRESSURE: 114 MMHG | BODY MASS INDEX: 33.6 KG/M2 | HEART RATE: 81 BPM | DIASTOLIC BLOOD PRESSURE: 79 MMHG

## 2022-07-01 DIAGNOSIS — Z82.0 FHX: MULTIPLE SCLEROSIS: Primary | ICD-10-CM

## 2022-07-01 DIAGNOSIS — H53.9 VISION CHANGES: ICD-10-CM

## 2022-07-01 DIAGNOSIS — Z82.0 FHX: MULTIPLE SCLEROSIS: ICD-10-CM

## 2022-07-01 DIAGNOSIS — N39.43 POST-VOID DRIBBLING: ICD-10-CM

## 2022-07-01 PROCEDURE — 36415 COLL VENOUS BLD VENIPUNCTURE: CPT | Performed by: PATHOLOGY

## 2022-07-01 PROCEDURE — 99205 OFFICE O/P NEW HI 60 MIN: CPT | Performed by: PSYCHIATRY & NEUROLOGY

## 2022-07-01 PROCEDURE — 86255 FLUORESCENT ANTIBODY SCREEN: CPT | Mod: 90 | Performed by: PATHOLOGY

## 2022-07-01 PROCEDURE — 99000 SPECIMEN HANDLING OFFICE-LAB: CPT | Performed by: PATHOLOGY

## 2022-07-01 PROCEDURE — G0463 HOSPITAL OUTPT CLINIC VISIT: HCPCS

## 2022-07-01 ASSESSMENT — PAIN SCALES - GENERAL: PAINLEVEL: NO PAIN (0)

## 2022-07-01 NOTE — PATIENT INSTRUCTIONS
Your exam is reassuring     Blood testing for MOG     We need to obtain the MRI images done at [facility] in December    Vitamin D3 - consistently take 5000 international unit(s) daily     Paleo diet can be helpful to reduce risk of inflammatory conditions    Consider MRI pending review of the December studies     Pelvic floor therapy     Follow up in December       Reach out if you experience the following:   Decreased vision from one eye, often associated with pain behind the eye     Double vision, often associated with balance difficulty like you are drunk, or vertigo/dizziness    Decreased sensation in an arm or leg that worsens day by day and is present for more than 24 hours    In general, symptoms of an MS relapse progress over a few days  If symptoms are present for less than 24 hours it is not concerning

## 2022-07-01 NOTE — LETTER
Date:July 1, 2022      Provider requested that no letter be sent. Do not send.       [facility]

## 2022-07-01 NOTE — LETTER
7/1/2022       RE: Keisha Carroll  [address]  [place] [address]     Dear Colleague,    Thank you for referring your patient, Keisha Carroll, to the [facility] MULTIPLE SCLEROSIS CLINIC [place] at [facility]. Please see a copy of my visit note below.    Date of Service: 7/1/2022    [facility] Neurology   MS Clinic Evaluation    History of Present Illness: 37-year-old woman with a history of asthma and anxiety who presents for evaluation of possible multiple sclerosis.    There is a positive family history of multiple sclerosis and a maternal grandfather and a distant maternal cousin.  This has made her more concerned for symptoms that she has had recently, though has been given unclear guidance via private prior provider.    She recalls that in March 2021 she underwent a routine eye exam.  She into the eye doctor that she would notice changes in her vision when she went outside.  She describes it as having warm vision from one eye in cold vision from the other.  This prompted concern for neuropathy.  She was referred to neurology.    She does not report having any distinct symptoms concerning for an episode of optic neuritis, brainstem syndrome, or transverse myelitis.  She does have some imbalance, though feels that some of this is related to incomplete recovery of core strength following pregnancy.  He did have a fall earlier this year that led to a metatarsal fracture requiring surgery.  This occurred because she simply misstepped on the stair.    She does have intermittent tingling in the hands.  She has pain in her neck that extends into her left shoulder and sometimes all the way into the fifth digit.  She finds that stretching helps, but ibuprofen does not.    When she voids her bladder she feels that she has incomplete voiding because she will dribble after standing.  She denies any dysfunction of bowel however.  She has had 2 prior  pregnancies.    She struggles with fatigue.  This affects her on a daily basis and will typically come on around 2 PM.  Did notice improvement in this when she started treatment for depression.  She finds her sleep to be restorative, though typically needs 9 hours of sleep per night.  Takes a nap routinely around 2 PM, which is beneficial.    Allergies   Allergen Reactions     Amoxicillin Hives       Current Outpatient Medications   Medication     loratadine (CLARITIN) 10 MG tablet     magnesium 250 MG tablet     sertraline (ZOLOFT) 50 MG tablet     vitamin D3 (CHOLECALCIFEROL) 50 mcg (2000 units) tablet     acetaminophen (TYLENOL) 500 MG tablet     hydrOXYzine (ATARAX) 25 MG tablet     No current facility-administered medications for this visit.        Past medical, surgical, social and family history was personally reviewed. Pertinent details noted above.     Physical Examination:   /79 (BP Location: Right arm, Patient Position: Sitting, Cuff Size: Adult Regular)   Pulse 81   Wt 91.6 kg (201 lb 14.4 oz)   SpO2 97%   BMI 33.60 kg/m      General: no acute distress  Cranial nerves:   VFFC  PERRL w/no RAPD  EOM full w/no ALMAS   Face symmetric  Hearing intact  No dysarthria   Motor:   Tone is normal   Bulk is normal     R L  Deltoid  5 5  Biceps  5 5  Triceps 5 5  Wrist ext 5 5  Finger ext 5 5  Finger abd 5 5    Hip flexion 5 5  Knee flexion 5 5  Knee ext 5 5  Ankle d/f 5 5    Reflexes: 2+ and symmetric throughout, babinski absent bilaterally  Sensory: vibration is normal in the toes, JPS normal in the toes   Romberg is absent  Coordination: no ataxia or dysmetria  Gait: normal base and stride, tandem gait is intact, able to balance on one foot and hop x 5 on the left foot, a bit more imbalance on the right foot (recent sx)     Tests/Imaging:   B12, folate wnl   aqp4 neg    Csf 2 wbc, 0 ocb  Normal IgG index     MRI brain   5/2021 - personal review, single non specific lesion in the left frontal dwm, gd-      MRI cervical spine   5/2021 - unremarkable, no demyelinating lesions    Assessment: 37-year-old woman who has had periodic symptoms of uncertain etiology.  Reassurance was provided that clinical history does not support a prior event of optic neuritis, transverse myelitis, or a brainstem syndrome.    We discussed the worst possible case scenario given that her OCT did show mild thinning in the left eye.  If this did represent a prior episode of optic neuritis, her risk of progressing to multiple sclerosis in the next 5 years would be 6% given her normal MRI brain.  She does have a single T2 hyperintense lesion in the deep white matter that is nonspecific and does not meet criteria for multiple sclerosis.  CSF testing was also negative.    I do think it would be worth her time to work with a therapist to address her bladder symptoms.    Minimally, we discussed things that she can do proactively to decrease her risk of developing multiple sclerosis.  This would include taking a vitamin D supplements, following a paleo diet, and maintaining an active lifestyle aiming for 150 minutes of activity over the course of 1 week that is intense enough that she is about to break a sweat.    Plan:   -Mogg antibody  - Pelvic floor therapy  - Follow-up in December    Note was completed with the assistance of Dragon Fluency software which can often result in accidental word substitutions.     A total of 60 minutes on the date of service were spent in the care of this patient.   Amie Daniel MD on 7/1/2022 at 10:03 AM          Again, thank you for allowing me to participate in the care of your patient.      Sincerely,    Amie Daniel MD

## 2022-07-01 NOTE — PROGRESS NOTES
Date of Service: 7/1/2022    [facility] Neurology   MS Clinic Evaluation    History of Present Illness: 37-year-old woman with a history of asthma and anxiety who presents for evaluation of possible multiple sclerosis.    There is a positive family history of multiple sclerosis and a maternal grandfather and a distant maternal cousin.  This has made her more concerned for symptoms that she has had recently, though has been given unclear guidance via private prior provider.    She recalls that in March 2021 she underwent a routine eye exam.  She into the eye doctor that she would notice changes in her vision when she went outside.  She describes it as having warm vision from one eye in cold vision from the other.  This prompted concern for neuropathy.  She was referred to neurology.    She does not report having any distinct symptoms concerning for an episode of optic neuritis, brainstem syndrome, or transverse myelitis.  She does have some imbalance, though feels that some of this is related to incomplete recovery of core strength following pregnancy.  He did have a fall earlier this year that led to a metatarsal fracture requiring surgery.  This occurred because she simply misstepped on the stair.    She does have intermittent tingling in the hands.  She has pain in her neck that extends into her left shoulder and sometimes all the way into the fifth digit.  She finds that stretching helps, but ibuprofen does not.    When she voids her bladder she feels that she has incomplete voiding because she will dribble after standing.  She denies any dysfunction of bowel however.  She has had 2 prior pregnancies.    She struggles with fatigue.  This affects her on a daily basis and will typically come on around 2 PM.  Did notice improvement in this when she started treatment for depression.  She finds her sleep to be restorative, though typically needs 9 hours of sleep per night.  Takes a nap routinely around 2 PM, which is  beneficial.    Allergies   Allergen Reactions     Amoxicillin Hives       Current Outpatient Medications   Medication     loratadine (CLARITIN) 10 MG tablet     magnesium 250 MG tablet     sertraline (ZOLOFT) 50 MG tablet     vitamin D3 (CHOLECALCIFEROL) 50 mcg (2000 units) tablet     acetaminophen (TYLENOL) 500 MG tablet     hydrOXYzine (ATARAX) 25 MG tablet     No current facility-administered medications for this visit.        Past medical, surgical, social and family history was personally reviewed. Pertinent details noted above.     Physical Examination:   /79 (BP Location: Right arm, Patient Position: Sitting, Cuff Size: Adult Regular)   Pulse 81   Wt 91.6 kg (201 lb 14.4 oz)   SpO2 97%   BMI 33.60 kg/m      General: no acute distress  Cranial nerves:   VFFC  PERRL w/no RAPD  EOM full w/no ALMAS   Face symmetric  Hearing intact  No dysarthria   Motor:   Tone is normal   Bulk is normal     R L  Deltoid  5 5  Biceps  5 5  Triceps 5 5  Wrist ext 5 5  Finger ext 5 5  Finger abd 5 5    Hip flexion 5 5  Knee flexion 5 5  Knee ext 5 5  Ankle d/f 5 5    Reflexes: 2+ and symmetric throughout, babinski absent bilaterally  Sensory: vibration is normal in the toes, JPS normal in the toes   Romberg is absent  Coordination: no ataxia or dysmetria  Gait: normal base and stride, tandem gait is intact, able to balance on one foot and hop x 5 on the left foot, a bit more imbalance on the right foot (recent sx)     Tests/Imaging:   B12, folate wnl   aqp4 neg    Csf 2 wbc, 0 ocb  Normal IgG index     MRI brain   5/2021 - personal review, single non specific lesion in the left frontal dwm, gd-     MRI cervical spine   5/2021 - unremarkable, no demyelinating lesions    Assessment: 37-year-old woman who has had periodic symptoms of uncertain etiology.  Reassurance was provided that clinical history does not support a prior event of optic neuritis, transverse myelitis, or a brainstem syndrome.    We discussed the worst  possible case scenario given that her OCT did show mild thinning in the left eye.  If this did represent a prior episode of optic neuritis, her risk of progressing to multiple sclerosis in the next 5 years would be 6% given her normal MRI brain.  She does have a single T2 hyperintense lesion in the deep white matter that is nonspecific and does not meet criteria for multiple sclerosis.  CSF testing was also negative.    I do think it would be worth her time to work with a therapist to address her bladder symptoms.    Minimally, we discussed things that she can do proactively to decrease her risk of developing multiple sclerosis.  This would include taking a vitamin D supplements, following a paleo diet, and maintaining an active lifestyle aiming for 150 minutes of activity over the course of 1 week that is intense enough that she is about to break a sweat.    Plan:   -Mogg antibody  - Pelvic floor therapy  - Follow-up in December    Note was completed with the assistance of Dragon Fluency software which can often result in accidental word substitutions.     A total of 60 minutes on the date of service were spent in the care of this patient.   Amie Daniel MD on 7/1/2022 at 10:03 AM

## 2022-07-07 LAB
AQP4 H2O CHANNEL IGG SERPL QL: NEGATIVE
CNS DEMYELINATING DISEASE INTERPRETATION, SERUM: NORMAL
MOG FACS, S: NEGATIVE

## 2022-07-18 NOTE — PROGRESS NOTES
Discharge Note    Progress reporting period is from initial evaluation date (please see noted date below) to May 16, 2022.  Linked Episodes   Type: Episode: Status: Noted: Resolved: Last update: Updated by:   PHYSICAL THERAPY Right Foot 5/2022 Active 5/6/2022 5/16/2022  4:31 PM Carlita Tse, PT      Comments:DOS: 3/30/2022       Keisha failed to follow up and current status is unknown.  Please see information below for last relevant information on current status.  Patient seen for 2 visits.    SUBJECTIVE  Subjective changes noted by patient:  Better overall. Walking more without boot. Exercises are going well. Walked for 20 minutes with family which went well.  .  Current pain level is 2/10.     Previous pain level was   .   Changes in function:  Yes (See Goal flowsheet attached for changes in current functional level)  Adverse reaction to treatment or activity: None    OBJECTIVE  Changes noted in objective findings: tight right gastroc; gait: decreased push off on right, hypomobile right TC joint     ASSESSMENT/PLAN  Diagnosis: Right ORIF to 5th Met   Updated problem list and treatment plan:     STG/LTGs have been met or progress has been made towards goals:  Yes, please see goal flowsheet for most current information  Assessment of Progress: current status is unknown.    Last current status: Pt is progressing as expected   Self Management Plans:  HEP  I have re-evaluated this patient and find that the nature, scope, duration and intensity of the therapy is appropriate for the medical condition of the patient.  Keisha continues to require the following intervention to meet STG and LTG's:  HEP.    Recommendations:  Discharge with current home program.  Patient to follow up with MD as needed.    Please refer to the daily flowsheet for treatment today, total treatment time and time spent performing 1:1 timed codes.

## 2022-10-03 ENCOUNTER — HEALTH MAINTENANCE LETTER (OUTPATIENT)
Age: 37
End: 2022-10-03

## 2022-12-12 ENCOUNTER — THERAPY VISIT (OUTPATIENT)
Dept: PHYSICAL THERAPY | Facility: CLINIC | Age: 37
End: 2022-12-12
Payer: COMMERCIAL

## 2022-12-12 DIAGNOSIS — Z98.890 S/P FOOT SURGERY, RIGHT: Primary | ICD-10-CM

## 2022-12-12 PROCEDURE — 97161 PT EVAL LOW COMPLEX 20 MIN: CPT | Mod: GP

## 2022-12-12 PROCEDURE — 97110 THERAPEUTIC EXERCISES: CPT | Mod: GP

## 2022-12-12 NOTE — PROGRESS NOTES
Physical Therapy Initial Evaluation  Therapist Impression: Keisha is a 37 year old year old female referred to physical therapy by Dr. Rut Tapia for treatment s/p R foot surgery. Subjective history and objective findings are consistent with post op status and continued weakness, tightness and impaired balance. Due to these impairments, patient has difficulty with normal physical activities/exercise. Patient will benefit from skilled PT to address impairments/limitations in order to reach patient's goals, facilitate return to prior level of function, and maximize participation.    KEY FINDINGS:  1. Ankle ROM WNL, loss R CKC ankle DF compared to L  2. B hip ABD weakness  3. R PF and eversion weakness    Subjective:  The history is provided by the patient. No  was used.   Therapist Generated HPI Evaluation  Problem details: Pt presents with continued complaints s/p R fifth met fracture and ORIF, DOS 3/20/2022. She was seen post operatively for 2 PT visits in May. Has tried increasing activity with weight lifting and yoga. Feels she has tightness on lateral aspect of lower leg, like knee and ankle are misaligned. Having some R hip pain and lateral calf soreness/feeling of bruising. .         Type of problem:  Right ankle.    This is a chronic condition.  Condition occurred with:  A fall/slip.  Where condition occurred: at home.  Site of Pain: lateral calf and at times lateral foot.  Pain quality: primary complaints is tightness. and is intermittent.  Pain radiates to:  No radiation. Pain timing: not time dependent.  Since onset symptoms are gradually improving.  Associated symptoms:  Loss of motion/stiffness (off balanlce). Exacerbated by: tightness in certain positions.  Relieved by: lacrosse ball, stretching.    Previous treatment includes surgery.   Restrictions due to condition include:  Working in normal job without restrictions.  Barriers include:  None as reported by patient.                         Objective:  System    Ankle/Foot Evaluation  ROM:  Arom ankle eval: CKC DF L 12 cm, R 10 cm (knee to wall test)  AROM:    Dorsiflexion:  Left:   8  Right:   8  Plantarflexion:  Left:  WNL    Right:  WNL  Inversion:  Left:  52     Right:  55  Eversion:  45     Right:  45      PROM:                Pain: no pain    Strength:    Dorsiflexion:  Left: 5/5     Pain:   Right: 5/5   Pain:  Plantarflexion: Left: 5/5   Pain:   Right: 4/5  Pain:  Inversion:Left: 5/5  Pain:     Right: 5/5  Pain:  Eversion:Left: 5/5  Pain:  Right: 5-/5  Pain:                      PALPATION: normal    EDEMA: Edema ankle: good healing of scar, no edema.          MOBILITY TESTING:           Midtarsal Left: normal    Midtarsal Right: normal  First Ray Left: normal    First Ray Right: normal  FUNCTIONAL TESTS: Functional test ankle: SL balance proprioceptive difficulty on R>L, maintains x 30 sec.                                                   Hip Evaluation  Hip PROM:  : ~80 deg hip ER DOIRTA, limited L hip IR, WNL on R.                          Hip Strength:  : Hip ABD DORITA 4/5.                                         General     ROS    Assessment/Plan:    Patient is a 37 year old female with right side ankle complaints.    Patient has the following significant findings with corresponding treatment plan.                Diagnosis 1:  R foot pain  Pain -  manual therapy, splint/taping/bracing/orthotics, self management, education and home program  Decreased ROM/flexibility - manual therapy, therapeutic exercise, therapeutic activity and home program  Decreased joint mobility - manual therapy, therapeutic exercise, therapeutic activity and home program  Decreased strength - therapeutic exercise, therapeutic activities and home program  Impaired balance - neuro re-education, therapeutic activities and home program  Decreased function - therapeutic activities and home program    Therapy Evaluation Codes:   Cumulative Therapy Evaluation is: Low  complexity.    Previous and current functional limitations:  (See Goal Flow Sheet for this information)    Short term and Long term goals: (See Goal Flow Sheet for this information)     Communication ability:  Patient appears to be able to clearly communicate and understand verbal and written communication and follow directions correctly.  Treatment Explanation - The following has been discussed with the patient:   RX ordered/plan of care  Anticipated outcomes  Possible risks and side effects  This patient would benefit from PT intervention to resume normal activities.   Rehab potential is excellent.    Frequency:  1 X week, once daily  Duration:  for 4 weeks  Discharge Plan:  Achieve all LTG.  Independent in home treatment program.  Reach maximal therapeutic benefit.    Please refer to the daily flowsheet for treatment today, total treatment time and time spent performing 1:1 timed codes.

## 2022-12-26 ENCOUNTER — THERAPY VISIT (OUTPATIENT)
Dept: PHYSICAL THERAPY | Facility: CLINIC | Age: 37
End: 2022-12-26
Payer: COMMERCIAL

## 2022-12-26 DIAGNOSIS — Z98.890 S/P FOOT SURGERY, RIGHT: Primary | ICD-10-CM

## 2022-12-26 PROCEDURE — 97110 THERAPEUTIC EXERCISES: CPT | Mod: GP | Performed by: PHYSICAL THERAPIST

## 2022-12-29 ENCOUNTER — OFFICE VISIT (OUTPATIENT)
Dept: OBGYN | Facility: CLINIC | Age: 37
End: 2022-12-29
Payer: COMMERCIAL

## 2022-12-29 VITALS
DIASTOLIC BLOOD PRESSURE: 73 MMHG | HEIGHT: 65 IN | WEIGHT: 202 LBS | SYSTOLIC BLOOD PRESSURE: 132 MMHG | OXYGEN SATURATION: 100 % | HEART RATE: 85 BPM | BODY MASS INDEX: 33.66 KG/M2

## 2022-12-29 DIAGNOSIS — N90.7 SEBACEOUS CYST OF LABIA: Primary | ICD-10-CM

## 2022-12-29 PROCEDURE — 99202 OFFICE O/P NEW SF 15 MIN: CPT | Performed by: OBSTETRICS & GYNECOLOGY

## 2022-12-29 NOTE — PROGRESS NOTES
"CC:  Check vaginal cyst  HPI:  Keisha Carroll is a 37 year old female No LMP recorded.  Her primary wanted vaginal cyst rechecked.  For over the last 2 years has had a small pea-sized lump on her right labia near the opening.  Believes it developed after childbirth and is unchanged.  Does not interfere with intercourse and is nontender.  Spouse had vasectomy, no semen analysis performed.    Allergies: Amoxicillin      EXAM:  Blood pressure 132/73, pulse 85, height 1.651 m (5' 5\"), weight 91.6 kg (202 lb), SpO2 100 %.  General - pleasant female in no acute distress.  Abdomen - soft, nontender, nondistended, no hepatosplenomegaly.  Pelvic - EG:  adult female with small firm sebaceous cyst noted on right labia minora approximately 3 mm (see images), BUS: within normal limits, Vagina: well rugated, no discharge.  Rectovaginal - deferred.  Psychiactric - appropriate mood and affect  Neurological - alert and oriented X 3      ASSESSMENT/PLAN:  (N90.7) Sebaceous cyst of labia  (primary encounter diagnosis)  Comment: Right labia  Plan: Discussed this would easily be removed but no need as 100% benign.  Also not bothering her.  Discussed that should not grow or change and to let me know if it is doing so.  Reviewed needs to have semen analysis after vasectomy to ensure that it actually worked and questions answered.    Had Pap smear done June of this year.    Mi Baldwin MD    "

## 2022-12-29 NOTE — Clinical Note
Please abstract the following data from this visit with this patient into the appropriate field in Epic:  Tests that can be patient reported without a hard copy:  Pap smear done on this date: 6/9/22 (approximately), by this group: [facility], results were normal.   Other Tests found in the patient's chart through Chart Review/Care Everywhere:  {Abstract Quality List (Optional):759627}  Note to Abstraction: If this section is blank, no results were found via Chart Review/Care Everywhere.

## 2023-03-27 NOTE — PROGRESS NOTES
Discharge Note    Progress reporting period is from initial evaluation date (please see noted date below) to Dec 26, 2022.  Linked Episodes   Type: Episode: Status: Noted: Resolved: Last update: Updated by:   PHYSICAL THERAPY R foot 12/12/22 Active 12/12/2022 12/26/2022 10:47 AM Mariia Austin, PT      Comments:       Keisha failed to follow up and current status is unknown.  Please see information below for last relevant information on current status.  Patient seen for 2 visits.    SUBJECTIVE  Subjective changes noted by patient:  Patient reports overall improvement in function. Able to lunge without support. Still intermittent pain in lateral ankle/foot. Stiffness in anterior ankle.  .  Current pain level is 0/10.     Previous pain level was  0/10.   Changes in function:  Yes (See Goal flowsheet attached for changes in current functional level)  Adverse reaction to treatment or activity: None    OBJECTIVE  Changes noted in objective findings: right ankle AROM WNL; hypomobile right talocrural joint     ASSESSMENT/PLAN  Diagnosis: R ankle pain   Updated problem list and treatment plan:     STG/LTGs have been met or progress has been made towards goals:  Yes, please see goal flowsheet for most current information  Assessment of Progress: current status is unknown.    Last current status:     Self Management Plans:  HEP  I have re-evaluated this patient and find that the nature, scope, duration and intensity of the therapy is appropriate for the medical condition of the patient.  Keisha continues to require the following intervention to meet STG and LTG's:  HEP.    Recommendations:  Discharge with current home program.  Patient to follow up with MD as needed.    Please refer to the daily flowsheet for treatment today, total treatment time and time spent performing 1:1 timed codes.

## 2023-08-12 ENCOUNTER — HEALTH MAINTENANCE LETTER (OUTPATIENT)
Age: 38
End: 2023-08-12

## 2024-01-26 ENCOUNTER — NURSE TRIAGE (OUTPATIENT)
Dept: NURSING | Facility: CLINIC | Age: 39
End: 2024-01-26
Payer: COMMERCIAL

## 2024-01-26 ENCOUNTER — OFFICE VISIT (OUTPATIENT)
Dept: URGENT CARE | Facility: URGENT CARE | Age: 39
End: 2024-01-26
Payer: COMMERCIAL

## 2024-01-26 VITALS
OXYGEN SATURATION: 99 % | HEIGHT: 65 IN | BODY MASS INDEX: 31.65 KG/M2 | WEIGHT: 190 LBS | TEMPERATURE: 97.3 F | SYSTOLIC BLOOD PRESSURE: 104 MMHG | DIASTOLIC BLOOD PRESSURE: 72 MMHG | HEART RATE: 85 BPM

## 2024-01-26 DIAGNOSIS — A09 TRAVELER'S DIARRHEA: Primary | ICD-10-CM

## 2024-01-26 LAB
ACANTHOCYTES BLD QL SMEAR: NORMAL
ALBUMIN UR-MCNC: NEGATIVE MG/DL
ANION GAP SERPL CALCULATED.3IONS-SCNC: 9 MMOL/L (ref 7–15)
APPEARANCE UR: CLEAR
AUER BODIES BLD QL SMEAR: NORMAL
BASO STIPL BLD QL SMEAR: NORMAL
BASOPHILS # BLD AUTO: 0 10E3/UL (ref 0–0.2)
BASOPHILS NFR BLD AUTO: 0 %
BILIRUB UR QL STRIP: NEGATIVE
BITE CELLS BLD QL SMEAR: NORMAL
BLISTER CELLS BLD QL SMEAR: NORMAL
BUN SERPL-MCNC: 11 MG/DL (ref 6–20)
BURR CELLS BLD QL SMEAR: NORMAL
CALCIUM SERPL-MCNC: 9 MG/DL (ref 8.6–10)
CHLORIDE SERPL-SCNC: 103 MMOL/L (ref 98–107)
COLOR UR AUTO: YELLOW
CREAT SERPL-MCNC: 0.74 MG/DL (ref 0.51–0.95)
DACRYOCYTES BLD QL SMEAR: NORMAL
DEPRECATED HCO3 PLAS-SCNC: 27 MMOL/L (ref 22–29)
EGFRCR SERPLBLD CKD-EPI 2021: >90 ML/MIN/1.73M2
ELLIPTOCYTES BLD QL SMEAR: NORMAL
EOSINOPHIL # BLD AUTO: 0.2 10E3/UL (ref 0–0.7)
EOSINOPHIL NFR BLD AUTO: 3 %
ERYTHROCYTE [DISTWIDTH] IN BLOOD BY AUTOMATED COUNT: 12.3 % (ref 10–15)
FRAGMENTS BLD QL SMEAR: NORMAL
GLUCOSE SERPL-MCNC: 86 MG/DL (ref 70–99)
GLUCOSE UR STRIP-MCNC: NEGATIVE MG/DL
HCT VFR BLD AUTO: 42.5 % (ref 35–47)
HGB BLD-MCNC: 13.9 G/DL (ref 11.7–15.7)
HGB C CRYSTALS: NORMAL
HGB UR QL STRIP: NEGATIVE
HOWELL-JOLLY BOD BLD QL SMEAR: NORMAL
IMM GRANULOCYTES # BLD: 0 10E3/UL
IMM GRANULOCYTES NFR BLD: 0 %
KETONES UR STRIP-MCNC: NEGATIVE MG/DL
LEUKOCYTE ESTERASE UR QL STRIP: NEGATIVE
LYMPHOCYTES # BLD AUTO: 2.7 10E3/UL (ref 0.8–5.3)
LYMPHOCYTES NFR BLD AUTO: 40 %
MCH RBC QN AUTO: 27.5 PG (ref 26.5–33)
MCHC RBC AUTO-ENTMCNC: 32.7 G/DL (ref 31.5–36.5)
MCV RBC AUTO: 84 FL (ref 78–100)
MONOCYTES # BLD AUTO: 0.7 10E3/UL (ref 0–1.3)
MONOCYTES NFR BLD AUTO: 10 %
NEUTROPHILS # BLD AUTO: 3.2 10E3/UL (ref 1.6–8.3)
NEUTROPHILS NFR BLD AUTO: 47 %
NEUTS HYPERSEG BLD QL SMEAR: NORMAL
NITRATE UR QL: NEGATIVE
NRBC # BLD AUTO: 0 10E3/UL
NRBC BLD AUTO-RTO: 0 /100
PH UR STRIP: 5.5 [PH] (ref 5–7)
PLAT MORPH BLD: NORMAL
PLATELET # BLD AUTO: 306 10E3/UL (ref 150–450)
POLYCHROMASIA BLD QL SMEAR: NORMAL
POTASSIUM SERPL-SCNC: 3.7 MMOL/L (ref 3.4–5.3)
RBC # BLD AUTO: 5.05 10E6/UL (ref 3.8–5.2)
RBC AGGLUT BLD QL: NORMAL
RBC MORPH BLD: NORMAL
ROULEAUX BLD QL SMEAR: NORMAL
SICKLE CELLS BLD QL SMEAR: NORMAL
SMUDGE CELLS BLD QL SMEAR: NORMAL
SODIUM SERPL-SCNC: 139 MMOL/L (ref 135–145)
SP GR UR STRIP: >=1.03 (ref 1–1.03)
SPHEROCYTES BLD QL SMEAR: NORMAL
STOMATOCYTES BLD QL SMEAR: NORMAL
TARGETS BLD QL SMEAR: NORMAL
TOXIC GRANULES BLD QL SMEAR: NORMAL
UROBILINOGEN UR STRIP-ACNC: 0.2 E.U./DL
VARIANT LYMPHS BLD QL SMEAR: NORMAL
WBC # BLD AUTO: 6.8 10E3/UL (ref 4–11)

## 2024-01-26 PROCEDURE — 85025 COMPLETE CBC W/AUTO DIFF WBC: CPT | Performed by: PHYSICIAN ASSISTANT

## 2024-01-26 PROCEDURE — 80048 BASIC METABOLIC PNL TOTAL CA: CPT | Performed by: PHYSICIAN ASSISTANT

## 2024-01-26 PROCEDURE — 81003 URINALYSIS AUTO W/O SCOPE: CPT | Performed by: PHYSICIAN ASSISTANT

## 2024-01-26 PROCEDURE — 36415 COLL VENOUS BLD VENIPUNCTURE: CPT | Performed by: PHYSICIAN ASSISTANT

## 2024-01-26 PROCEDURE — 99214 OFFICE O/P EST MOD 30 MIN: CPT | Performed by: PHYSICIAN ASSISTANT

## 2024-01-26 RX ORDER — LOPERAMIDE HYDROCHLORIDE 2 MG/1
TABLET ORAL
Qty: 16 TABLET | Refills: 0 | Status: SHIPPED | OUTPATIENT
Start: 2024-01-26

## 2024-01-26 ASSESSMENT — ENCOUNTER SYMPTOMS
VOMITING: 0
CHILLS: 0
NAUSEA: 0
ABDOMINAL PAIN: 0
FEVER: 0
DIARRHEA: 1

## 2024-01-26 NOTE — TELEPHONE ENCOUNTER
Nurse Triage SBAR    Is this a 2nd Level Triage? NO    Situation:  Patient calling reporting she returned from [place] and has been having diarrhea x 5 days.    Background:   Recent travel to [place]    Assessment:     Patient reporting she returned from [place] Sunday 1/21/24.  New onset of diarrhea starting Monday 1/22/24.  Reporting diarrhea is gradually improving.   X 3 episodes in morning and 5-6 in evening.  Denies any visible blood in stool.  Rectal area is sore swollen, patient concerned she may have a hemorrhoid.  Afebrile.  Denies stomach pain or cramping.  Taking fluids. Reports some mild dizziness when getting up.   Reporting stools are becoming more firm today.    Protocol Recommended Disposition:   See today in office. Patient will go to [facility]. Reviewed signs and symptoms of dehydration.    Reason for Disposition   SEVERE diarrhea (e.g., 7 or more times / day more than normal) and present > 24 hours (1 day)   Patient wants to be seen    Additional Information   Negative: Shock suspected (e.g., cold/pale/clammy skin, too weak to stand, low BP, rapid pulse)   Negative: Difficult to awaken or acting confused (e.g., disoriented, slurred speech)   Negative: Sounds like a life-threatening emergency to the triager   Negative: SEVERE abdominal pain (e.g., excruciating) and present > 1 hour   Negative: SEVERE abdominal pain and age > 60 years   Negative: Bloody, black, or tarry bowel movements  (Exception: Chronic-unchanged black-grey bowel movements and is taking iron pills or Pepto-Bismol.)   Negative: SEVERE diarrhea (e.g., 7 or more times / day more than normal) and age > 60 years   Negative: Constant abdominal pain lasting > 2 hours   Negative: Drinking very little and dehydration suspected (e.g., no urine > 12 hours, very dry mouth, very lightheaded)   Negative: Patient sounds very sick or weak to the triager   Negative: Sounds like a life-threatening emergency to the triager   Negative: Severe  rectal pain   Negative: Rectal pain or redness and fever > 100.4 F (38.0 C)   Negative: Acute onset rectal pain and constipation (straining with rectal pressure or fullness), which is not relieved by Sitz bath or suppository   Negative: MODERATE-SEVERE rectal pain (i.e., interferes with school, work, or sleep)   Negative: MODERATE-SEVERE rectal itching (i.e., interferes with school, work, or sleep)   Negative: Last bowel movement (BM) > 4 days ago   Negative: Rectal area looks infected (e.g., draining sore, spreading redness)   Negative: Rash of rectal area (e.g., open sore, painful tiny water blisters, unexplained bumps)   Negative: Patient is worried they have a sexually transmitted infection (STI)   Negative: Home treatment > 3 days for rectal pain and not improved   Negative: Home treatment for > 3 days for rectal itching and not improved    Protocols used: Diarrhea-A-OH, Rectal Symptoms-A-OH

## 2024-01-26 NOTE — PROGRESS NOTES
Assessment & Plan       1. Traveler's diarrhea    -Labs are reassuring. She has normal WBC, electrolytes and renal function. Symptoms are getting better. She notes diarrhea is resolving, stools are lessening each day. She was concerned about dehydration, bacterial infection. Discussed antibiotics would be indicated if she was febrile, bloody stools and worsening diarrhea. Discussed the risks and benefits of antibacterial treatment. Advised that the travelers diarrhea may be viral.   - Basic metabolic panel  - CBC with platelets and differential  - UA Macroscopic with reflex to Microscopic and Culture - Clinic Collect  - loperamide (IMODIUM A-D) 2 MG tablet; Take 4 mg (2 tablets) once. Follow that with 2 mg (1 tablet) after having a loose stool. Maximum 16 mg per day. Use for 2 days  Dispense: 16 tablet; Refill: 0    Results for orders placed or performed in visit on 01/26/24   Basic metabolic panel     Status: Normal   Result Value Ref Range    Sodium 139 135 - 145 mmol/L    Potassium 3.7 3.4 - 5.3 mmol/L    Chloride 103 98 - 107 mmol/L    Carbon Dioxide (CO2) 27 22 - 29 mmol/L    Anion Gap 9 7 - 15 mmol/L    Urea Nitrogen 11.0 6.0 - 20.0 mg/dL    Creatinine 0.74 0.51 - 0.95 mg/dL    GFR Estimate >90 >60 mL/min/1.73m2    Calcium 9.0 8.6 - 10.0 mg/dL    Glucose 86 70 - 99 mg/dL   UA Macroscopic with reflex to Microscopic and Culture - Clinic Collect     Status: Normal    Specimen: Urine, Clean Catch   Result Value Ref Range    Color Urine Yellow Colorless, Straw, Light Yellow, Yellow    Appearance Urine Clear Clear    Glucose Urine Negative Negative mg/dL    Bilirubin Urine Negative Negative    Ketones Urine Negative Negative mg/dL    Specific Gravity Urine >=1.030 1.003 - 1.035    Blood Urine Negative Negative    pH Urine 5.5 5.0 - 7.0    Protein Albumin Urine Negative Negative mg/dL    Urobilinogen Urine 0.2 0.2, 1.0 E.U./dL    Nitrite Urine Negative Negative    Leukocyte Esterase Urine Negative Negative     Narrative    Microscopic not indicated   CBC with platelets and differential     Status: None   Result Value Ref Range    WBC Count 6.8 4.0 - 11.0 10e3/uL    RBC Count 5.05 3.80 - 5.20 10e6/uL    Hemoglobin 13.9 11.7 - 15.7 g/dL    Hematocrit 42.5 35.0 - 47.0 %    MCV 84 78 - 100 fL    MCH 27.5 26.5 - 33.0 pg    MCHC 32.7 31.5 - 36.5 g/dL    RDW 12.3 10.0 - 15.0 %    Platelet Count 306 150 - 450 10e3/uL    % Neutrophils 47 %    % Lymphocytes 40 %    % Monocytes 10 %    % Eosinophils 3 %    % Basophils 0 %    % Immature Granulocytes 0 %    NRBCs per 100 WBC 0 <1 /100    Absolute Neutrophils 3.2 1.6 - 8.3 10e3/uL    Absolute Lymphocytes 2.7 0.8 - 5.3 10e3/uL    Absolute Monocytes 0.7 0.0 - 1.3 10e3/uL    Absolute Eosinophils 0.2 0.0 - 0.7 10e3/uL    Absolute Basophils 0.0 0.0 - 0.2 10e3/uL    Absolute Immature Granulocytes 0.0 <=0.4 10e3/uL    Absolute NRBCs 0.0 10e3/uL   RBC and Platelet Morphology     Status: None   Result Value Ref Range    Platelet Assessment  Automated Count Confirmed. Platelet morphology is normal.     Automated Count Confirmed. Platelet morphology is normal.    Acanthocytes      Abdirashid Rods      Basophilic Stippling      Bite Cells      Blister Cells      Michaela Cells      Elliptocytes      Hgb C Crystals      Hodgson-Jolly Bodies      Hypersegmented Neutrophils      Polychromasia      RBC agglutination      RBC Fragments      Reactive Lymphocytes      Rouleaux      Sickle Cells      Smudge Cells      Spherocytes      Stomatocytes      Target Cells      Teardrop Cells      Toxic Neutrophils      RBC Morphology Confirmed RBC Indices    CBC with platelets and differential     Status: None    Narrative    The following orders were created for panel order CBC with platelets and differential.  Procedure                               Abnormality         Status                     ---------                               -----------         ------                     CBC with platelets and d...[[id number]]                       Final result               RBC and Platelet Morphology[[id number]]                      Final result                 Please view results for these tests on the individual orders.          Patient Instructions   Increase fluid intake with chicken/beef or vegetable broth, fruit juice, or Gatorade  Can make your own oral rehydrating fluid using the following recipe : 0.5 teaspoon of salt + 0.5 teaspoon of baking soda + 4 tablespoons of sugar to 1 liter of water   Alternate acetaminophen and ibuprofen as needed for aches, pains or fever.   Follow clear liquid to BRAT diet (bananas, rice, applesauce, toast) as needed for any upset stomach.   Rest as much as possible.   Follow up clinic if symptoms persist or worsen or you show signs of dehydration including decreased urination, lack of tears, dry mouth.       Return if symptoms worsen or fail to improve, for Follow up.    At the end of the encounter, I discussed results, diagnosis, medications. Discussed red flags for immediate return to clinic/ER, as well as indications for follow up if no improvement. Patient understood and agreed to plan. Patient was stable for discharge.    Joy Valdes is a 38 year old female who presents to clinic today for the following health issues:  Chief Complaint   Patient presents with    Diarrhea     X5 days of diarrhea after traveling to [place]     Urgent Care     HPI    Patient reports diarrhea for 5 days.  Patient traveled to [place] and came back 5 days ago.  She notes  having up to 20 episodes of diarrhea at the beginning of the illness.  She is now having about 4-5 episodes for the past 2 days.  She denies blood in the stools or mucus.  She tried Pepto-Bismol which helped.  She denies fever or chills, bloody stools, nausea or vomiting.     Review of Systems   Constitutional:  Negative for chills and fever.   Gastrointestinal:  Positive for diarrhea. Negative for abdominal pain, nausea and vomiting.  "      Problem List:  2021-06: Anxiety  2021-06: Asthma  2021-06: History of depression      No past medical history on file.    Social History     Tobacco Use    Smoking status: Never    Smokeless tobacco: Never   Substance Use Topics    Alcohol use: Yes     Comment: 1 drink once or twice a week            Objective    /72   Pulse 85   Temp 97.3  F (36.3  C) (Temporal)   Ht 1.651 m (5' 5\")   Wt 86.2 kg (190 lb)   SpO2 99%   BMI 31.62 kg/m    Physical Exam  Vitals and nursing note reviewed.   Cardiovascular:      Rate and Rhythm: Normal rate and regular rhythm.   Pulmonary:      Effort: Pulmonary effort is normal.      Breath sounds: Normal breath sounds.   Abdominal:      General: Abdomen is flat.      Palpations: Abdomen is soft.      Tenderness: There is generalized abdominal tenderness. There is no right CVA tenderness or left CVA tenderness.   Lymphadenopathy:      Cervical: No cervical adenopathy.   Skin:     General: Skin is warm and dry.      Findings: No rash.   Neurological:      General: No focal deficit present.      Mental Status: She is alert and oriented to person, place, and time.   Psychiatric:         Mood and Affect: Mood normal.         Behavior: Behavior normal.              Melissa Peter PA-C    "

## 2024-07-27 ENCOUNTER — HEALTH MAINTENANCE LETTER (OUTPATIENT)
Age: 39
End: 2024-07-27

## 2025-08-10 ENCOUNTER — HEALTH MAINTENANCE LETTER (OUTPATIENT)
Age: 40
End: 2025-08-10

## (undated) DEVICE — SU VICRYL 2-0 PS-2 27" UNDYED J428H

## (undated) DEVICE — BNDG ELASTIC 4" DBL LENGTH UNSTERILE 6611-14

## (undated) DEVICE — NDL 25GA 1.5" 305127

## (undated) DEVICE — PREP SKIN SCRUB TRAY 4461A

## (undated) DEVICE — GLOVE PROTEXIS BLUE W/NEU-THERA 6.5  2D73EB65

## (undated) DEVICE — DRAPE C-ARM MINI 5423

## (undated) DEVICE — Device

## (undated) DEVICE — GLOVE PROTEXIS W/NEU-THERA 6.5  2D73TE65

## (undated) DEVICE — LINEN HALF SHEET 5512

## (undated) DEVICE — PREP POVIDONE IODINE SOLUTION 10% 4OZ BOTTLE 29906-004

## (undated) DEVICE — SU PROLENE 4-0 PS-2 18" 8682G

## (undated) DEVICE — LINEN FULL SHEET 5511

## (undated) DEVICE — PACK LOWER EXTREMITY RIDGES

## (undated) DEVICE — SU VICRYL 3-0 CT-2 27" UND J232H

## (undated) DEVICE — BAG CLEAR TRASH 1.3M 39X33" P4040C

## (undated) DEVICE — ESU GROUND PAD ADULT W/CORD E7507

## (undated) DEVICE — PREP POVIDONE-IODINE 7.5% SCRUB 4OZ BOTTLE MDS093945

## (undated) DEVICE — SYR 10ML FINGER CONTROL W/O NDL 309695

## (undated) DEVICE — LINEN ORTHO ACL PACK 5447

## (undated) DEVICE — CAST PADDING 4" STERILE 9044S

## (undated) DEVICE — BB-TAK

## (undated) DEVICE — CAST PADDING 4" UNSTERILE 9044

## (undated) RX ORDER — CLINDAMYCIN PHOSPHATE 900 MG/50ML
INJECTION, SOLUTION INTRAVENOUS
Status: DISPENSED
Start: 2022-03-30

## (undated) RX ORDER — GLYCOPYRROLATE 0.2 MG/ML
INJECTION INTRAMUSCULAR; INTRAVENOUS
Status: DISPENSED
Start: 2022-03-30

## (undated) RX ORDER — ONDANSETRON 2 MG/ML
INJECTION INTRAMUSCULAR; INTRAVENOUS
Status: DISPENSED
Start: 2022-03-30

## (undated) RX ORDER — BUPIVACAINE HYDROCHLORIDE 5 MG/ML
INJECTION, SOLUTION EPIDURAL; INTRACAUDAL
Status: DISPENSED
Start: 2022-03-30

## (undated) RX ORDER — FENTANYL CITRATE 50 UG/ML
INJECTION, SOLUTION INTRAMUSCULAR; INTRAVENOUS
Status: DISPENSED
Start: 2022-03-30

## (undated) RX ORDER — PROPOFOL 10 MG/ML
INJECTION, EMULSION INTRAVENOUS
Status: DISPENSED
Start: 2022-03-30

## (undated) RX ORDER — KETOROLAC TROMETHAMINE 30 MG/ML
INJECTION, SOLUTION INTRAMUSCULAR; INTRAVENOUS
Status: DISPENSED
Start: 2022-03-30